# Patient Record
Sex: FEMALE | Race: WHITE | NOT HISPANIC OR LATINO | Employment: OTHER | URBAN - NONMETROPOLITAN AREA
[De-identification: names, ages, dates, MRNs, and addresses within clinical notes are randomized per-mention and may not be internally consistent; named-entity substitution may affect disease eponyms.]

---

## 2017-02-07 ENCOUNTER — LAB (OUTPATIENT)
Dept: ONCOLOGY | Facility: HOSPITAL | Age: 82
End: 2017-02-07

## 2017-02-07 ENCOUNTER — LAB (OUTPATIENT)
Dept: ONCOLOGY | Facility: CLINIC | Age: 82
End: 2017-02-07

## 2017-02-07 DIAGNOSIS — D64.9 NORMOCYTIC ANEMIA: Primary | ICD-10-CM

## 2017-02-07 DIAGNOSIS — D64.9 NORMOCYTIC ANEMIA: ICD-10-CM

## 2017-02-07 LAB
BASOPHILS # BLD AUTO: 0.02 10*3/MM3 (ref 0–0.3)
BASOPHILS NFR BLD AUTO: 0.4 % (ref 0–2)
CRP SERPL-MCNC: <0.5 MG/DL (ref 0–0.99)
DAT POLY-SP REAG RBC QL: NEGATIVE
DEPRECATED RDW RBC AUTO: 46.7 FL (ref 37–54)
EOSINOPHIL # BLD AUTO: 0.57 10*3/MM3 (ref 0–0.7)
EOSINOPHIL NFR BLD AUTO: 11.1 % (ref 0–7)
ERYTHROCYTE [DISTWIDTH] IN BLOOD BY AUTOMATED COUNT: 13.4 % (ref 11.5–14.5)
ERYTHROCYTE [SEDIMENTATION RATE] IN BLOOD: 37 MM/HR (ref 0–30)
FERRITIN SERPL-MCNC: 34 NG/ML (ref 10–290.3)
FOLATE SERPL-MCNC: >24 NG/ML (ref 5.4–20)
HCT VFR BLD AUTO: 29.6 % (ref 37–47)
HGB BLD-MCNC: 9.7 G/DL (ref 12–16)
IMM GRANULOCYTES # BLD: 0.01 10*3/MM3 (ref 0–0.03)
IMM GRANULOCYTES NFR BLD: 0.2 % (ref 0–0.5)
IRON 24H UR-MRATE: 60 MCG/DL (ref 49–151)
IRON SATN MFR SERPL: 17 % (ref 15–50)
LDH SERPL-CCNC: 192 U/L (ref 135–225)
LYMPHOCYTES # BLD AUTO: 1.75 10*3/MM3 (ref 1–3)
LYMPHOCYTES NFR BLD AUTO: 34.2 % (ref 16–46)
MCH RBC QN AUTO: 31.6 PG (ref 27–33)
MCHC RBC AUTO-ENTMCNC: 32.8 G/DL (ref 33–37)
MCV RBC AUTO: 96.4 FL (ref 80–94)
MONOCYTES # BLD AUTO: 0.59 10*3/MM3 (ref 0.1–0.9)
MONOCYTES NFR BLD AUTO: 11.5 % (ref 0–12)
NEUTROPHILS # BLD AUTO: 2.18 10*3/MM3 (ref 1.4–6.5)
NEUTROPHILS NFR BLD AUTO: 42.6 % (ref 40–75)
PLATELET # BLD AUTO: 246 10*3/MM3 (ref 130–400)
PMV BLD AUTO: 9.4 FL (ref 6–10)
RBC # BLD AUTO: 3.07 10*6/MM3 (ref 4.2–5.4)
RETICS #: 0.03 10*6/MM3 (ref 0.02–0.13)
RETICS/RBC NFR AUTO: 1.12 % (ref 0.5–2)
TIBC SERPL-MCNC: 360 MCG/DL (ref 241–421)
TSH SERPL DL<=0.05 MIU/L-ACNC: 1.04 MIU/ML (ref 0.55–4.78)
VIT B12 BLD-MCNC: 787 PG/ML (ref 211–911)
WBC NRBC COR # BLD: 5.12 10*3/MM3 (ref 4.5–12.5)

## 2017-02-07 PROCEDURE — 36415 COLL VENOUS BLD VENIPUNCTURE: CPT

## 2017-02-07 PROCEDURE — 86880 COOMBS TEST DIRECT: CPT

## 2017-02-07 PROCEDURE — 85060 BLOOD SMEAR INTERPRETATION: CPT | Performed by: INTERNAL MEDICINE

## 2017-02-08 LAB — HAPTOGLOB SERPL-MCNC: 196 MG/DL (ref 34–200)

## 2017-02-10 LAB
CYTOLOGIST CVX/VAG CYTO: NORMAL
PATH INTERP BLD-IMP: NORMAL

## 2017-03-28 ENCOUNTER — OFFICE VISIT (OUTPATIENT)
Dept: ONCOLOGY | Facility: CLINIC | Age: 82
End: 2017-03-28

## 2017-03-28 ENCOUNTER — LAB (OUTPATIENT)
Dept: ONCOLOGY | Facility: CLINIC | Age: 82
End: 2017-03-28

## 2017-03-28 VITALS
HEART RATE: 79 BPM | SYSTOLIC BLOOD PRESSURE: 120 MMHG | TEMPERATURE: 97.2 F | DIASTOLIC BLOOD PRESSURE: 55 MMHG | OXYGEN SATURATION: 96 % | RESPIRATION RATE: 18 BRPM | WEIGHT: 170.3 LBS

## 2017-03-28 DIAGNOSIS — D64.9 NORMOCYTIC ANEMIA: ICD-10-CM

## 2017-03-28 DIAGNOSIS — D64.9 NORMOCYTIC ANEMIA: Primary | ICD-10-CM

## 2017-03-28 LAB
BASOPHILS # BLD AUTO: 0.03 10*3/MM3 (ref 0–0.3)
BASOPHILS NFR BLD AUTO: 0.6 % (ref 0–2)
CHROMATIN AB SERPL-ACNC: 0 IU/ML (ref 0–14)
CRP SERPL-MCNC: <0.5 MG/DL (ref 0–0.99)
DEPRECATED RDW RBC AUTO: 47.5 FL (ref 37–54)
EOSINOPHIL # BLD AUTO: 0.48 10*3/MM3 (ref 0–0.7)
EOSINOPHIL NFR BLD AUTO: 9 % (ref 0–7)
ERYTHROCYTE [DISTWIDTH] IN BLOOD BY AUTOMATED COUNT: 13.7 % (ref 11.5–14.5)
ERYTHROCYTE [SEDIMENTATION RATE] IN BLOOD: 32 MM/HR (ref 0–30)
FERRITIN SERPL-MCNC: 42 NG/ML (ref 10–290.3)
HCT VFR BLD AUTO: 30.5 % (ref 37–47)
HGB BLD-MCNC: 9.8 G/DL (ref 12–16)
IMM GRANULOCYTES # BLD: 0 10*3/MM3 (ref 0–0.03)
IMM GRANULOCYTES NFR BLD: 0 % (ref 0–0.5)
IRON 24H UR-MRATE: 61 MCG/DL (ref 49–151)
IRON SATN MFR SERPL: 17 % (ref 15–50)
LYMPHOCYTES # BLD AUTO: 1.71 10*3/MM3 (ref 1–3)
LYMPHOCYTES NFR BLD AUTO: 32 % (ref 16–46)
MCH RBC QN AUTO: 30.6 PG (ref 27–33)
MCHC RBC AUTO-ENTMCNC: 32.1 G/DL (ref 33–37)
MCV RBC AUTO: 95.3 FL (ref 80–94)
MONOCYTES # BLD AUTO: 0.61 10*3/MM3 (ref 0.1–0.9)
MONOCYTES NFR BLD AUTO: 11.4 % (ref 0–12)
NEUTROPHILS # BLD AUTO: 2.51 10*3/MM3 (ref 1.4–6.5)
NEUTROPHILS NFR BLD AUTO: 47 % (ref 40–75)
PLATELET # BLD AUTO: 222 10*3/MM3 (ref 130–400)
PMV BLD AUTO: 9.3 FL (ref 6–10)
RBC # BLD AUTO: 3.2 10*6/MM3 (ref 4.2–5.4)
TIBC SERPL-MCNC: 363 MCG/DL (ref 241–421)
WBC NRBC COR # BLD: 5.34 10*3/MM3 (ref 4.5–12.5)

## 2017-03-28 PROCEDURE — 85025 COMPLETE CBC W/AUTO DIFF WBC: CPT | Performed by: INTERNAL MEDICINE

## 2017-03-28 PROCEDURE — 84165 PROTEIN E-PHORESIS SERUM: CPT | Performed by: INTERNAL MEDICINE

## 2017-03-28 PROCEDURE — 99215 OFFICE O/P EST HI 40 MIN: CPT | Performed by: INTERNAL MEDICINE

## 2017-03-28 PROCEDURE — 82525 ASSAY OF COPPER: CPT | Performed by: INTERNAL MEDICINE

## 2017-03-28 PROCEDURE — 83550 IRON BINDING TEST: CPT | Performed by: INTERNAL MEDICINE

## 2017-03-28 PROCEDURE — 84155 ASSAY OF PROTEIN SERUM: CPT | Performed by: INTERNAL MEDICINE

## 2017-03-28 PROCEDURE — 86431 RHEUMATOID FACTOR QUANT: CPT | Performed by: INTERNAL MEDICINE

## 2017-03-28 PROCEDURE — 85652 RBC SED RATE AUTOMATED: CPT | Performed by: INTERNAL MEDICINE

## 2017-03-28 PROCEDURE — 83883 ASSAY NEPHELOMETRY NOT SPEC: CPT | Performed by: INTERNAL MEDICINE

## 2017-03-28 PROCEDURE — 86038 ANTINUCLEAR ANTIBODIES: CPT | Performed by: INTERNAL MEDICINE

## 2017-03-28 PROCEDURE — 86334 IMMUNOFIX E-PHORESIS SERUM: CPT | Performed by: INTERNAL MEDICINE

## 2017-03-28 PROCEDURE — 86140 C-REACTIVE PROTEIN: CPT | Performed by: INTERNAL MEDICINE

## 2017-03-28 PROCEDURE — 83540 ASSAY OF IRON: CPT | Performed by: INTERNAL MEDICINE

## 2017-03-28 PROCEDURE — 36415 COLL VENOUS BLD VENIPUNCTURE: CPT | Performed by: INTERNAL MEDICINE

## 2017-03-28 PROCEDURE — 82728 ASSAY OF FERRITIN: CPT | Performed by: INTERNAL MEDICINE

## 2017-03-28 NOTE — PROGRESS NOTES
Kat Kenney  [id number]  1/3/1932  3/28/2017      Referring Provider:   Dr. Evelyn Douglas    Reason for Followup:   Normocytic Anemia    Chief Complaint:  Sinus drainage, allergies      History of Present Illness:  Kat Kenney is a very pleasant 85 y.o.  female who presents in follow up appointment for further management and evaluation of normocytic anemia.     She reports that she was found to have anemia while in the nursing home for rehabilitation after knee replacement. She is currently living at home and given her anemia that was seen she was then referred to our clinic for further evaluation. She reports that she most recently had a laboratory evaluation with her primary care physician two days ago and reports that she was still anemic. She was placed on oral iron however has been unable to take this due to intolerance and is now currently taking a multivitamin. She had previously followed with Dr. Morris for possible underlying malignancy but reports that workup was negative and she has not followed him in for two years. She did however followed with him in October and received 2 units of packed red blood cells. She did also have a bone marrow in 2006 which did reveal a mildly hypercellular marrow for her age with trilineage hematopoiesis.     Interim History:  She denies of any significant complaints today and reports some fatigue which she reports has improved since the start of an oral multivitmain containing iron. She continues to remain on plaquenil and methotrexate for her inflammatory arthritis.       The following portions of the patient's history were reviewed and updated as appropriate: allergies, current medications, past family history, past medical history, past social history, past surgical history and problem list.    Allergies   Allergen Reactions   • Benadryl [Diphenhydramine Hcl (Sleep)]    • Levaquin [Levofloxacin]    • Penicillins        Past Medical History:   Diagnosis  Date   • Anemia    • Asthma    • Disease of thyroid gland    • GERD (gastroesophageal reflux disease)    • Hypertension    Coronary artery disease s/p stent placement   Sick sinus syndrome  Hypothryoidism  Hyperlipidemia    Past Surgical History:   Procedure Laterality Date   • HYSTERECTOMY     • REPLACEMENT TOTAL KNEE     • SINUS SURGERY     Pacemaker    Social History     Social History   • Marital status:      Spouse name: N/A   • Number of children: N/A   • Years of education: N/A     Occupational History   • Not on file.     Social History Main Topics   • Smoking status: Never Smoker   • Smokeless tobacco: Never Used   • Alcohol use No   • Drug use: No   • Sexual activity: Not on file     Other Topics Concern   • Not on file     Social History Narrative   She lives by herself.    Family History   Problem Relation Age of Onset   • Breast cancer Sister    • Cancer Brother          Current Outpatient Prescriptions:   •  Albuterol Sulfate (PROAIR HFA IN), Inhale 2 puffs 2 (two) times a day., Disp: , Rfl:   •  aspirin 81 MG EC tablet, Take 81 mg by mouth daily., Disp: , Rfl:   •  atorvastatin (LIPITOR) 20 MG tablet, Take 20 mg by mouth daily., Disp: , Rfl:   •  calcium carbonate (OS-LETY) 600 MG tablet, Take 600 mg by mouth daily., Disp: , Rfl:   •  conjugated estrogens (PREMARIN) 0.625 MG/GM vaginal cream, Insert 0.5 g into the vagina daily., Disp: , Rfl:   •  dicyclomine (BENTYL) 10 MG capsule, Take 10 mg by mouth 4 (four) times a day before meals and nightly., Disp: , Rfl:   •  DULoxetine (CYMBALTA) 20 MG capsule, Take 20 mg by mouth daily., Disp: , Rfl:   •  fluticasone-salmeterol (ADVAIR DISKUS) 250-50 MCG/DOSE DISKUS, Inhale 1 puff 2 (two) times a day., Disp: , Rfl:   •  folic acid (FOLVITE) 1 MG tablet, Take 1 mg by mouth daily., Disp: , Rfl:   •  hydrochlorothiazide (MICROZIDE) 12.5 MG capsule, Take 12.5 mg by mouth daily., Disp: , Rfl:   •  hydroxychloroquine (PLAQUENIL) 200 MG tablet, Take 200 mg by  mouth daily., Disp: , Rfl:   •  levothyroxine (SYNTHROID, LEVOTHROID) 100 MCG tablet, Take 100 mcg by mouth daily., Disp: , Rfl:   •  losartan (COZAAR) 25 MG tablet, Take 25 mg by mouth daily., Disp: , Rfl:   •  methotrexate 2.5 MG tablet, Take 2.5 mg by mouth 1 (one) time per week., Disp: , Rfl:   •  metoprolol tartrate (LOPRESSOR) 50 MG tablet, Take 50 mg by mouth 2 (two) times a day., Disp: , Rfl:   •  O2 (OXYGEN), Inhale 2 L/min every night., Disp: , Rfl:   •  omeprazole (PriLOSEC) 40 MG capsule, Take 40 mg by mouth daily., Disp: , Rfl:   •  Ped Multivitamins-Fl-Iron (MULTIVITAMIN WITH FLUORIDE/IRON) 0.25-10 MG/ML solution solution, Take  by mouth Daily., Disp: , Rfl:   •  Probiotic Product (PROBIOTIC DAILY PO), Take 1 tablet by mouth daily., Disp: , Rfl:   •  sodium chloride (OCEAN) 0.65 % nasal spray, 1 spray into each nostril as needed for congestion., Disp: , Rfl:   •  traMADol (ULTRAM) 50 MG tablet, Take 50 mg by mouth 2 (two) times a day., Disp: , Rfl:   •  trimethoprim (TRIMPEX) 100 MG tablet, Take 100 mg by mouth 2 (two) times a day., Disp: , Rfl:         Review of Systems:  A comprehensive 12 point review of systems was obtained from patient and positive as per HPI otherwise negative. +fatigue improved, +weight gain, +sinus drainage, +constipation      Physical Exam:  Vital Signs: These were reviewed and listed as per patient’s electronic medical chart  Vitals:    03/28/17 1449   BP: 120/55   Pulse: 79   Resp: 18   Temp: 97.2 °F (36.2 °C)   SpO2: 96%     General: Awake, alert and oriented, in no distress, elderly  HEENT: Head is atraumatic, normocephalic, extraocular movements full, oropharynx clear, no scleral icterus, pink moist mucous membranes  Neck: supple, no jvd, lymphadenopathy or masses  Cardiovascular: regular rate and rhythm without murmurs, rubs or gallops  Pulmonary: non-labored, clear to auscultation bilaterally, no wheezing  Abdomen: soft, non-tender, non-distended, normal active bowel  sounds present, no organomegaly  Extremities: No clubbing, cyanosis or edema  Neurologic: Mental status as above, alert, awake and oriented, grossly non-focal exam  Skin: warm, dry, intact, chronic skin lesions  Lymph:  No cervical, supraclavicular, adenopathy  MSK: mobilizes with a cane, upper digit nodules and deviation        Labs / Studies:  LABS:  Results for CHAD WYNNE (MRN [id number]) as of 3/29/2017 08:06   Ref. Range 3/28/2017 15:45   Iron Latest Ref Range: 49 - 151 mcg/dL 61   Ferritin Latest Ref Range: 10.00 - 290.30 ng/mL 42.00   Iron Saturation Latest Ref Range: 15 - 50 % 17   TIBC Latest Ref Range: 241 - 421 mcg/dL 363   C-Reactive Protein Latest Ref Range: 0.00 - 0.99 mg/dL <0.50   WBC Latest Ref Range: 4.50 - 12.50 10*3/mm3 5.34   RBC Latest Ref Range: 4.20 - 5.40 10*6/mm3 3.20 (L)   Hemoglobin Latest Ref Range: 12.0 - 16.0 g/dL 9.8 (L)   Hematocrit Latest Ref Range: 37.0 - 47.0 % 30.5 (L)   RDW Latest Ref Range: 11.5 - 14.5 % 13.7   MCV Latest Ref Range: 80.0 - 94.0 fL 95.3 (H)   MCH Latest Ref Range: 27.0 - 33.0 pg 30.6   MCHC Latest Ref Range: 33.0 - 37.0 g/dL 32.1 (L)   MPV Latest Ref Range: 6.0 - 10.0 fL 9.3   Platelets Latest Ref Range: 130 - 400 10*3/mm3 222   RDW-SD Latest Ref Range: 37.0 - 54.0 fl 47.5   Neutrophil % Latest Ref Range: 40.0 - 75.0 % 47.0   Lymphocyte % Latest Ref Range: 16.0 - 46.0 % 32.0   Monocyte % Latest Ref Range: 0.0 - 12.0 % 11.4   Eosinophil % Latest Ref Range: 0.0 - 7.0 % 9.0 (H)   Basophil % Latest Ref Range: 0.0 - 2.0 % 0.6   Immature Grans % Latest Ref Range: 0.0 - 0.5 % 0.0   Neutrophils, Absolute Latest Ref Range: 1.40 - 6.50 10*3/mm3 2.51   Lymphocytes, Absolute Latest Ref Range: 1.00 - 3.00 10*3/mm3 1.71   Monocytes, Absolute Latest Ref Range: 0.10 - 0.90 10*3/mm3 0.61   Eosinophils, Absolute Latest Ref Range: 0.00 - 0.70 10*3/mm3 0.48   Basophils, Absolute Latest Ref Range: 0.00 - 0.30 10*3/mm3 0.03   Immature Grans, Absolute Latest Ref Range: 0.00 - 0.03  10*3/mm3 0.00   Sed Rate Latest Ref Range: 0 - 30 mm/hr 32 (H)   Rheumatoid Factor Quantitative Latest Ref Range: 0.0 - 14.0 IU/mL 0.0         PATHOLOGY:  09/02/16: Peripheral Smear: Normocytic anemia with mild anisopoikilocytosis. Normal white blood cell count and morphology, adequate platelets.  02/09/17: Peripheral Smear:          12/19/16: Flow cytometry:           Assessment/Plan :  Kat Kenney is a very pleasant 85 y.o.  female who presents in follow up appointment for further management and evaluation of normocytic anemia.      Normocytic Anemia  This is likely multifactorial in which iron deficiency versus anemia of chronic inflammation versus medications are contributing. A complete blood count continues to show ongoing anemia however this appears to be stable. She did receive 2 units of packed red blood cells at Dr. Márquez office in 10/2016. Her serum iron and iron saturation were low with a ferritin of 44 during that visit and she likely has some component of iron deficiency along with anemia of chronic inflammation. Today her iron panel continues to be within normal limits without supplementation (she has previously taken oral iron and was unable to tolerate the medication). Her B12 and folate were also normal. She had a negative GREY and did not show signs of hemolysis. ESR was elevated. Given thus far negative workup I did also send for an SPEP which showed no evidence of Mspike and there was no abnormal immunofixation. Her serum free light chains were both elevated with a normal ratio and thus is likely to underlying inflammation. It is possible that her inflammatory arthritis is causing inflammation which is suppressing her bone marrow and thus contributing to her anemia. I will also obtain an rheumatoid factor and SUDEEP, I also discussed the possibility of an underlying bone marrow disorder that could be contributing to her anemia, and I have recommended a bone marrow biopsy to further  evaluate. However, she does not want to pursue a bone marrow biopsy unless her anemia worsens. I did also obtain a flow cytometry which was significant for increased atypical NK cells and unsure of significance at present, this may be secondary to a reactive underlying process such as her inflammatory arthritis. I have also discussed with her the possibility of underlying bone marrow process that would require bone marrow biopsy to further evaluate. However at present she would like to continue to monitor. . She also was found to have increased eosinophils although eosinophils on differential have been normal with the exception of her previous lab draw and today's evaluation. Will repeat flow cytometry along with SPEP and serum free light chains. Will continue to reassess and monitor her blood counts and have her return in follow up appointment in 6 weeks for repeat laboratory evaluation.     I will have the patient return in follow up appointment in 3 months with labs in 6 weeks. She understands that should she have any questions or concerns prior to her appointment she should give us a call at any time and I would be happy to see her sooner. It was a pleasure to see this patient in clinic today, thank you for allowing me to participate in the care of this patient.    I spent 41 minutes in regards to this patient’s care today. More than 29 minutes of the time was spent in direct interaction with the patient for the above problems.        Rosmery Powell MD  03/28/17  3:04 PM

## 2017-03-29 LAB
ALBUMIN SERPL-MCNC: 3.3 G/DL (ref 2.9–4.4)
ALBUMIN/GLOB SERPL: 1.2 {RATIO} (ref 0.7–1.7)
ALPHA1 GLOB FLD ELPH-MCNC: 0.2 G/DL (ref 0–0.4)
ALPHA2 GLOB SERPL ELPH-MCNC: 0.7 G/DL (ref 0.4–1)
ANA SER QL: NEGATIVE
B-GLOBULIN SERPL ELPH-MCNC: 1 G/DL (ref 0.7–1.3)
GAMMA GLOB SERPL ELPH-MCNC: 1 G/DL (ref 0.4–1.8)
GLOBULIN SER CALC-MCNC: 2.9 G/DL (ref 2.2–3.9)
IGA SERPL-MCNC: 262 MG/DL (ref 64–422)
IGG SERPL-MCNC: 991 MG/DL (ref 700–1600)
IGM SERPL-MCNC: 33 MG/DL (ref 26–217)
INTERPRETATION SERPL IEP-IMP: NORMAL
KAPPA LC SERPL-MCNC: 34.48 MG/L (ref 3.3–19.4)
KAPPA LC/LAMBDA SER: 1.07 {RATIO} (ref 0.26–1.65)
LAMBDA LC FREE SERPL-MCNC: 32.34 MG/L (ref 5.71–26.3)
Lab: NORMAL
M-SPIKE: NORMAL G/DL
PROT SERPL-MCNC: 6.2 G/DL (ref 6–8.5)

## 2017-03-30 LAB — COPPER SERPL-MCNC: 127 UG/DL (ref 72–166)

## 2017-04-03 LAB — LEUK/LYMPH PHENO: NORMAL

## 2017-05-09 ENCOUNTER — LAB (OUTPATIENT)
Dept: ONCOLOGY | Facility: CLINIC | Age: 82
End: 2017-05-09

## 2017-05-09 VITALS
OXYGEN SATURATION: 97 % | DIASTOLIC BLOOD PRESSURE: 69 MMHG | SYSTOLIC BLOOD PRESSURE: 140 MMHG | RESPIRATION RATE: 18 BRPM | TEMPERATURE: 98.2 F | HEART RATE: 74 BPM

## 2017-05-09 DIAGNOSIS — D64.9 NORMOCYTIC ANEMIA: ICD-10-CM

## 2017-05-09 LAB
BASOPHILS # BLD AUTO: 0.03 10*3/MM3 (ref 0–0.3)
BASOPHILS NFR BLD AUTO: 0.5 % (ref 0–2)
DEPRECATED RDW RBC AUTO: 47.3 FL (ref 37–54)
EOSINOPHIL # BLD AUTO: 0.52 10*3/MM3 (ref 0–0.7)
EOSINOPHIL NFR BLD AUTO: 8 % (ref 0–7)
ERYTHROCYTE [DISTWIDTH] IN BLOOD BY AUTOMATED COUNT: 13.8 % (ref 11.5–14.5)
HCT VFR BLD AUTO: 30 % (ref 37–47)
HGB BLD-MCNC: 9.9 G/DL (ref 12–16)
IMM GRANULOCYTES # BLD: 0.01 10*3/MM3 (ref 0–0.03)
IMM GRANULOCYTES NFR BLD: 0.2 % (ref 0–0.5)
LYMPHOCYTES # BLD AUTO: 2.04 10*3/MM3 (ref 1–3)
LYMPHOCYTES NFR BLD AUTO: 31.4 % (ref 16–46)
MCH RBC QN AUTO: 31.4 PG (ref 27–33)
MCHC RBC AUTO-ENTMCNC: 33 G/DL (ref 33–37)
MCV RBC AUTO: 95.2 FL (ref 80–94)
MONOCYTES # BLD AUTO: 0.84 10*3/MM3 (ref 0.1–0.9)
MONOCYTES NFR BLD AUTO: 12.9 % (ref 0–12)
NEUTROPHILS # BLD AUTO: 3.05 10*3/MM3 (ref 1.4–6.5)
NEUTROPHILS NFR BLD AUTO: 47 % (ref 40–75)
PLATELET # BLD AUTO: 258 10*3/MM3 (ref 130–400)
PMV BLD AUTO: 9 FL (ref 6–10)
RBC # BLD AUTO: 3.15 10*6/MM3 (ref 4.2–5.4)
WBC NRBC COR # BLD: 6.49 10*3/MM3 (ref 4.5–12.5)

## 2017-05-09 PROCEDURE — 85025 COMPLETE CBC W/AUTO DIFF WBC: CPT | Performed by: INTERNAL MEDICINE

## 2017-05-09 PROCEDURE — 36415 COLL VENOUS BLD VENIPUNCTURE: CPT | Performed by: INTERNAL MEDICINE

## 2017-07-11 ENCOUNTER — OFFICE VISIT (OUTPATIENT)
Dept: ONCOLOGY | Facility: CLINIC | Age: 82
End: 2017-07-11

## 2017-07-11 VITALS
HEART RATE: 78 BPM | DIASTOLIC BLOOD PRESSURE: 61 MMHG | RESPIRATION RATE: 18 BRPM | TEMPERATURE: 98 F | WEIGHT: 171.9 LBS | OXYGEN SATURATION: 95 % | SYSTOLIC BLOOD PRESSURE: 131 MMHG

## 2017-07-11 DIAGNOSIS — R89.8 EOSINOPHIL COUNT RAISED: ICD-10-CM

## 2017-07-11 DIAGNOSIS — D64.9 NORMOCYTIC ANEMIA: Primary | ICD-10-CM

## 2017-07-11 LAB
ALBUMIN SERPL-MCNC: 4 G/DL (ref 3.4–4.8)
ALBUMIN/GLOB SERPL: 1.3 G/DL (ref 1.5–2.5)
ALP SERPL-CCNC: 69 U/L (ref 35–104)
ALT SERPL W P-5'-P-CCNC: 15 U/L (ref 10–36)
ANION GAP SERPL CALCULATED.3IONS-SCNC: 5.1 MMOL/L (ref 3.6–11.2)
AST SERPL-CCNC: 24 U/L (ref 10–30)
BASOPHILS # BLD AUTO: 0.02 10*3/MM3 (ref 0–0.3)
BASOPHILS NFR BLD AUTO: 0.3 % (ref 0–2)
BILIRUB SERPL-MCNC: 0.4 MG/DL (ref 0.2–1.8)
BUN BLD-MCNC: 19 MG/DL (ref 7–21)
BUN/CREAT SERPL: 22.1 (ref 7–25)
CALCIUM SPEC-SCNC: 8.7 MG/DL (ref 7.7–10)
CHLORIDE SERPL-SCNC: 100 MMOL/L (ref 99–112)
CO2 SERPL-SCNC: 27.9 MMOL/L (ref 24.3–31.9)
CREAT BLD-MCNC: 0.86 MG/DL (ref 0.43–1.29)
DEPRECATED RDW RBC AUTO: 44.7 FL (ref 37–54)
EOSINOPHIL # BLD AUTO: 0.62 10*3/MM3 (ref 0–0.7)
EOSINOPHIL NFR BLD AUTO: 10.1 % (ref 0–7)
ERYTHROCYTE [DISTWIDTH] IN BLOOD BY AUTOMATED COUNT: 13.1 % (ref 11.5–14.5)
FERRITIN SERPL-MCNC: 53 NG/ML (ref 10–290.3)
FOLATE SERPL-MCNC: >24 NG/ML (ref 5.4–20)
GFR SERPL CREATININE-BSD FRML MDRD: 63 ML/MIN/1.73
GLOBULIN UR ELPH-MCNC: 3 GM/DL
GLUCOSE BLD-MCNC: 102 MG/DL (ref 70–110)
HCT VFR BLD AUTO: 29.6 % (ref 37–47)
HGB BLD-MCNC: 9.7 G/DL (ref 12–16)
IMM GRANULOCYTES # BLD: 0.01 10*3/MM3 (ref 0–0.03)
IMM GRANULOCYTES NFR BLD: 0.2 % (ref 0–0.5)
IRON 24H UR-MRATE: 105 MCG/DL (ref 49–151)
IRON SATN MFR SERPL: 33 % (ref 15–50)
LYMPHOCYTES # BLD AUTO: 2.11 10*3/MM3 (ref 1–3)
LYMPHOCYTES NFR BLD AUTO: 34.4 % (ref 16–46)
MCH RBC QN AUTO: 30.6 PG (ref 27–33)
MCHC RBC AUTO-ENTMCNC: 32.8 G/DL (ref 33–37)
MCV RBC AUTO: 93.4 FL (ref 80–94)
MONOCYTES # BLD AUTO: 0.76 10*3/MM3 (ref 0.1–0.9)
MONOCYTES NFR BLD AUTO: 12.4 % (ref 0–12)
NEUTROPHILS # BLD AUTO: 2.62 10*3/MM3 (ref 1.4–6.5)
NEUTROPHILS NFR BLD AUTO: 42.6 % (ref 40–75)
OSMOLALITY SERPL CALC.SUM OF ELEC: 268.8 MOSM/KG (ref 273–305)
PLATELET # BLD AUTO: 262 10*3/MM3 (ref 130–400)
PMV BLD AUTO: 8.9 FL (ref 6–10)
POTASSIUM BLD-SCNC: 4.6 MMOL/L (ref 3.5–5.3)
PROT SERPL-MCNC: 7 G/DL (ref 6–8)
RBC # BLD AUTO: 3.17 10*6/MM3 (ref 4.2–5.4)
SODIUM BLD-SCNC: 133 MMOL/L (ref 135–153)
TIBC SERPL-MCNC: 319 MCG/DL (ref 241–421)
VIT B12 BLD-MCNC: 807 PG/ML (ref 211–911)
WBC NRBC COR # BLD: 6.14 10*3/MM3 (ref 4.5–12.5)

## 2017-07-11 PROCEDURE — 82728 ASSAY OF FERRITIN: CPT | Performed by: INTERNAL MEDICINE

## 2017-07-11 PROCEDURE — 83550 IRON BINDING TEST: CPT | Performed by: INTERNAL MEDICINE

## 2017-07-11 PROCEDURE — 82746 ASSAY OF FOLIC ACID SERUM: CPT | Performed by: INTERNAL MEDICINE

## 2017-07-11 PROCEDURE — 83540 ASSAY OF IRON: CPT | Performed by: INTERNAL MEDICINE

## 2017-07-11 PROCEDURE — 85025 COMPLETE CBC W/AUTO DIFF WBC: CPT | Performed by: INTERNAL MEDICINE

## 2017-07-11 PROCEDURE — 80053 COMPREHEN METABOLIC PANEL: CPT | Performed by: INTERNAL MEDICINE

## 2017-07-11 PROCEDURE — 99214 OFFICE O/P EST MOD 30 MIN: CPT | Performed by: INTERNAL MEDICINE

## 2017-07-11 PROCEDURE — 82607 VITAMIN B-12: CPT | Performed by: INTERNAL MEDICINE

## 2017-07-11 NOTE — PROGRESS NOTES
Kat Kenney  [id number]  1/3/1932  7/11/2017      Referring Provider:   Dr. Evelyn Douglas    Reason for Followup:   Normocytic Anemia    Chief Complaint:  Allergies      History of Present Illness:  Kat Kenney is a very pleasant 85 y.o.  female who presents in follow up appointment for further management and evaluation of normocytic anemia.     She reports that she was found to have anemia while in the nursing home for rehabilitation after knee replacement. She is currently living at home and given her anemia that was seen she was then referred to our clinic for further evaluation. She reports that she most recently had a laboratory evaluation with her primary care physician two days ago and reports that she was still anemic. She was placed on oral iron however has been unable to take this due to intolerance and is now currently taking a multivitamin. She had previously followed with Dr. Morris for possible underlying malignancy but reports that workup was negative and she has not followed him in for two years. She did however followed with him in October and received 2 units of packed red blood cells. She did also have a bone marrow in 2006 which did reveal a mildly hypercellular marrow for her age with trilineage hematopoiesis.     Interim History:  She denies of any significant changes since her last visit with me. She does note ongoing fatigue however this has overall improved. She continues to remain on plaquenil and methotrexate for her inflammatory arthritis. She does note that she does have severe allergies in which she has to follow with an allergist for intermittent injections.      The following portions of the patient's history were reviewed and updated as appropriate: allergies, current medications, past family history, past medical history, past social history, past surgical history and problem list.    Allergies   Allergen Reactions   • Benadryl [Diphenhydramine Hcl (Sleep)]    •  Levaquin [Levofloxacin]    • Penicillins        Past Medical History:   Diagnosis Date   • Anemia    • Asthma    • Disease of thyroid gland    • GERD (gastroesophageal reflux disease)    • Hypertension    Coronary artery disease s/p stent placement   Sick sinus syndrome  Hypothryoidism  Hyperlipidemia    Past Surgical History:   Procedure Laterality Date   • HYSTERECTOMY     • REPLACEMENT TOTAL KNEE     • SINUS SURGERY     Pacemaker    Social History     Social History   • Marital status:      Spouse name: N/A   • Number of children: N/A   • Years of education: N/A     Occupational History   • Not on file.     Social History Main Topics   • Smoking status: Never Smoker   • Smokeless tobacco: Never Used   • Alcohol use No   • Drug use: No   • Sexual activity: Not on file     Other Topics Concern   • Not on file     Social History Narrative   She lives by herself.    Family History   Problem Relation Age of Onset   • Breast cancer Sister    • Cancer Brother          Current Outpatient Prescriptions:   •  Albuterol Sulfate (PROAIR HFA IN), Inhale 2 puffs 2 (two) times a day., Disp: , Rfl:   •  aspirin 81 MG EC tablet, Take 81 mg by mouth daily., Disp: , Rfl:   •  atorvastatin (LIPITOR) 20 MG tablet, Take 20 mg by mouth daily., Disp: , Rfl:   •  calcium carbonate (OS-LETY) 600 MG tablet, Take 600 mg by mouth daily., Disp: , Rfl:   •  conjugated estrogens (PREMARIN) 0.625 MG/GM vaginal cream, Insert 0.5 g into the vagina daily., Disp: , Rfl:   •  dicyclomine (BENTYL) 10 MG capsule, Take 10 mg by mouth 4 (four) times a day before meals and nightly., Disp: , Rfl:   •  DULoxetine (CYMBALTA) 20 MG capsule, Take 20 mg by mouth daily., Disp: , Rfl:   •  fluticasone-salmeterol (ADVAIR DISKUS) 250-50 MCG/DOSE DISKUS, Inhale 1 puff 2 (two) times a day., Disp: , Rfl:   •  folic acid (FOLVITE) 1 MG tablet, Take 1 mg by mouth daily., Disp: , Rfl:   •  hydrochlorothiazide (MICROZIDE) 12.5 MG capsule, Take 12.5 mg by mouth  daily., Disp: , Rfl:   •  hydroxychloroquine (PLAQUENIL) 200 MG tablet, Take 200 mg by mouth daily., Disp: , Rfl:   •  levothyroxine (SYNTHROID, LEVOTHROID) 100 MCG tablet, Take 100 mcg by mouth daily., Disp: , Rfl:   •  losartan (COZAAR) 25 MG tablet, Take 25 mg by mouth daily., Disp: , Rfl:   •  methotrexate 2.5 MG tablet, Take 2.5 mg by mouth 1 (one) time per week., Disp: , Rfl:   •  metoprolol tartrate (LOPRESSOR) 50 MG tablet, Take 50 mg by mouth 2 (two) times a day., Disp: , Rfl:   •  O2 (OXYGEN), Inhale 2 L/min every night., Disp: , Rfl:   •  omeprazole (PriLOSEC) 40 MG capsule, Take 40 mg by mouth daily., Disp: , Rfl:   •  Ped Multivitamins-Fl-Iron (MULTIVITAMIN WITH FLUORIDE/IRON) 0.25-10 MG/ML solution solution, Take  by mouth Daily., Disp: , Rfl:   •  Probiotic Product (PROBIOTIC DAILY PO), Take 1 tablet by mouth daily., Disp: , Rfl:   •  sodium chloride (OCEAN) 0.65 % nasal spray, 1 spray into each nostril as needed for congestion., Disp: , Rfl:   •  traMADol (ULTRAM) 50 MG tablet, Take 50 mg by mouth 2 (two) times a day., Disp: , Rfl:   •  trimethoprim (TRIMPEX) 100 MG tablet, Take 100 mg by mouth 2 (two) times a day., Disp: , Rfl:         Review of Systems:  A comprehensive 12 point review of systems was obtained from patient and positive as per HPI otherwise negative. +fatigue improved, +joint pain      Physical Exam:  Vital Signs: These were reviewed and listed as per patient’s electronic medical chart  Vitals:    07/11/17 1405   BP: 131/61   Pulse: 78   Resp: 18   Temp: 98 °F (36.7 °C)   SpO2: 95%     General: Awake, alert and oriented, in no distress, elderly  HEENT: Head is atraumatic, normocephalic, extraocular movements full, oropharynx clear, no scleral icterus, pink moist mucous membranes  Neck: supple, no jvd, lymphadenopathy or masses  Cardiovascular: regular rate and rhythm without murmurs, rubs or gallops  Pulmonary: non-labored, clear to auscultation bilaterally, no wheezing  Abdomen:  soft, non-tender, non-distended, normal active bowel sounds present, no organomegaly  Extremities: No clubbing, cyanosis or edema  Neurologic: Mental status as above, alert, awake and oriented, grossly non-focal exam  Skin: warm, dry, intact, chronic skin lesions  Lymph:  No cervical, supraclavicular, adenopathy  MSK: mobilizes with a cane, upper digit nodules and deviation        Labs / Studies:  LABS:  Results for CHAD WYNNE (MRN [id number]) as of 7/13/2017 06:43   Ref. Range 7/11/2017 14:57   Glucose Latest Ref Range: 70 - 110 mg/dL 102   Sodium Latest Ref Range: 135 - 153 mmol/L 133 (L)   Potassium Latest Ref Range: 3.5 - 5.3 mmol/L 4.6   CO2 Latest Ref Range: 24.3 - 31.9 mmol/L 27.9   Chloride Latest Ref Range: 99 - 112 mmol/L 100   Anion Gap Latest Ref Range: 3.6 - 11.2 mmol/L 5.1   Creatinine Latest Ref Range: 0.43 - 1.29 mg/dL 0.86   BUN Latest Ref Range: 7 - 21 mg/dL 19   BUN/Creatinine Ratio Latest Ref Range: 7.0 - 25.0  22.1   Calcium Latest Ref Range: 7.7 - 10.0 mg/dL 8.7   eGFR Non African Amer Latest Ref Range: >60 mL/min/1.73 63   Alkaline Phosphatase Latest Ref Range: 35 - 104 U/L 69   Total Protein Latest Ref Range: 6.0 - 8.0 g/dL 7.0   ALT (SGPT) Latest Ref Range: 10 - 36 U/L 15   AST (SGOT) Latest Ref Range: 10 - 30 U/L 24   Total Bilirubin Latest Ref Range: 0.2 - 1.8 mg/dL 0.4   Albumin Latest Ref Range: 3.40 - 4.80 g/dL 4.00   Globulin Latest Units: gm/dL 3.0   A/G Ratio Latest Ref Range: 1.5 - 2.5 g/dL 1.3 (L)   Iron Latest Ref Range: 49 - 151 mcg/dL 105   Ferritin Latest Ref Range: 10.00 - 290.30 ng/mL 53.00   Iron Saturation Latest Ref Range: 15 - 50 % 33   TIBC Latest Ref Range: 241 - 421 mcg/dL 319   Folate Latest Ref Range: 5.40 - 20.00 ng/mL >24.00 (H)   Vitamin B-12 Latest Ref Range: 211 - 911 pg/mL 807   WBC Latest Ref Range: 4.50 - 12.50 10*3/mm3 6.14   RBC Latest Ref Range: 4.20 - 5.40 10*6/mm3 3.17 (L)   Hemoglobin Latest Ref Range: 12.0 - 16.0 g/dL 9.7 (L)   Hematocrit Latest Ref  Range: 37.0 - 47.0 % 29.6 (L)   RDW Latest Ref Range: 11.5 - 14.5 % 13.1   MCV Latest Ref Range: 80.0 - 94.0 fL 93.4   MCH Latest Ref Range: 27.0 - 33.0 pg 30.6   MCHC Latest Ref Range: 33.0 - 37.0 g/dL 32.8 (L)   MPV Latest Ref Range: 6.0 - 10.0 fL 8.9   Platelets Latest Ref Range: 130 - 400 10*3/mm3 262   RDW-SD Latest Ref Range: 37.0 - 54.0 fl 44.7   Neutrophil % Latest Ref Range: 40.0 - 75.0 % 42.6   Lymphocyte % Latest Ref Range: 16.0 - 46.0 % 34.4   Monocyte % Latest Ref Range: 0.0 - 12.0 % 12.4 (H)   Eosinophil % Latest Ref Range: 0.0 - 7.0 % 10.1 (H)   Basophil % Latest Ref Range: 0.0 - 2.0 % 0.3   Immature Grans % Latest Ref Range: 0.0 - 0.5 % 0.2   Neutrophils, Absolute Latest Ref Range: 1.40 - 6.50 10*3/mm3 2.62   Lymphocytes, Absolute Latest Ref Range: 1.00 - 3.00 10*3/mm3 2.11   Monocytes, Absolute Latest Ref Range: 0.10 - 0.90 10*3/mm3 0.76   Eosinophils, Absolute Latest Ref Range: 0.00 - 0.70 10*3/mm3 0.62   Basophils, Absolute Latest Ref Range: 0.00 - 0.30 10*3/mm3 0.02   Immature Grans, Absolute Latest Ref Range: 0.00 - 0.03 10*3/mm3 0.01         PATHOLOGY:  09/02/16: Peripheral Smear: Normocytic anemia with mild anisopoikilocytosis. Normal white blood cell count and morphology, adequate platelets.  02/09/17: Peripheral Smear:          12/19/16: Flow cytometry:       03/29/17: Flow Cytometry:          Assessment/Plan :  Kat Kenney is a very pleasant 85 y.o.  female who presents in follow up appointment for further management and evaluation of normocytic anemia.      Normocytic Anemia  This is likely multifactorial in which iron deficiency versus anemia of chronic inflammation versus medications are contributing. A complete blood count continues to show ongoing anemia however this appears to be stable. She did receive 2 units of packed red blood cells at Dr. Márquez office in 10/2016. Her serum iron and iron saturation were low with a ferritin of 44 during that visit and she likely has some  component of iron deficiency along with anemia of chronic inflammation. Today her iron panel continues to be within normal limits without supplementation (she has previously taken oral iron and was unable to tolerate the medication). Her B12 and folate were also normal. She had a negative GREY and did not show signs of hemolysis. ESR was elevated. Given thus far negative workup I did also send for an SPEP which showed no evidence of Mspike and there was no abnormal immunofixation. Her serum free light chains were both elevated with a normal ratio and thus is likely to underlying inflammation. It is possible that her inflammatory arthritis is causing inflammation which is suppressing her bone marrow and thus contributing to her anemia. I will also obtain an rheumatoid factor and SUDEEP, I also discussed the possibility of an underlying bone marrow disorder that could be contributing to her anemia, and I have recommended a bone marrow biopsy to further evaluate. However, she does not want to pursue a bone marrow biopsy unless her anemia worsens. I did also obtain a flow cytometry which was significant for increased atypical NK cells and unsure of significance at present, this may be secondary to a reactive underlying process such as her inflammatory arthritis. I have also discussed with her the possibility of underlying bone marrow process that would require bone marrow biopsy to further evaluate. However at present she would like to continue to monitor. She also was found to have increased eosinophils although eosinophils on differential have been normal with the exception of her previous lab draw and today's evaluation. On repeat flow cytometry NK cells were no longer noted however she continues to have an elevated eosinophil level. Therefore will prescribe Ivermectin in the event that an underlying parasite infection is contributing however this may be related to her underlying allergies which she describes as being  severe.     Elevated Eosinophils  I suspect this is likely secondary to her severe underlying allergies that she describes. Will also however treat for possible underlying parasitic infection with Ivermectin and will re-evaluate and next visit.      I will have the patient return in follow up appointment in 3 months. She understands that should she have any questions or concerns prior to her appointment she should give us a call at any time and I would be happy to see her sooner. It was a pleasure to see this patient in clinic today, thank you for allowing me to participate in the care of this patient.    I spent 31 minutes in regards to this patient’s care today. More than 25 minutes of the time was spent in direct interaction with the patient for the above problems.        Rosmery Powell MD  07/11/17  2:07 PM

## 2017-07-13 RX ORDER — IVERMECTIN 3 MG/1
200 TABLET ORAL ONCE
Status: CANCELLED | OUTPATIENT
Start: 2017-07-13 | End: 2017-07-13

## 2017-07-17 RX ORDER — IVERMECTIN 3 MG/1
15 TABLET ORAL ONCE
Qty: 10 TABLET | Refills: 0 | Status: SHIPPED | OUTPATIENT
Start: 2017-07-17 | End: 2017-07-17

## 2017-10-04 ENCOUNTER — TRANSCRIBE ORDERS (OUTPATIENT)
Dept: ADMINISTRATIVE | Facility: HOSPITAL | Age: 82
End: 2017-10-04

## 2017-10-04 DIAGNOSIS — Z12.39 BREAST SCREENING: Primary | ICD-10-CM

## 2017-10-10 ENCOUNTER — OFFICE VISIT (OUTPATIENT)
Dept: ONCOLOGY | Facility: CLINIC | Age: 82
End: 2017-10-10

## 2017-10-10 VITALS
TEMPERATURE: 98.8 F | SYSTOLIC BLOOD PRESSURE: 126 MMHG | HEART RATE: 73 BPM | OXYGEN SATURATION: 96 % | RESPIRATION RATE: 18 BRPM | DIASTOLIC BLOOD PRESSURE: 65 MMHG | WEIGHT: 174.6 LBS

## 2017-10-10 DIAGNOSIS — R89.8 EOSINOPHIL COUNT RAISED: ICD-10-CM

## 2017-10-10 DIAGNOSIS — D50.9 IRON DEFICIENCY ANEMIA, UNSPECIFIED IRON DEFICIENCY ANEMIA TYPE: ICD-10-CM

## 2017-10-10 DIAGNOSIS — D64.9 NORMOCYTIC ANEMIA: Primary | ICD-10-CM

## 2017-10-10 LAB
BASOPHILS # BLD AUTO: 0.02 10*3/MM3 (ref 0–0.3)
BASOPHILS NFR BLD AUTO: 0.3 % (ref 0–2)
DEPRECATED RDW RBC AUTO: 43.6 FL (ref 37–54)
EOSINOPHIL # BLD AUTO: 0.6 10*3/MM3 (ref 0–0.7)
EOSINOPHIL NFR BLD AUTO: 10 % (ref 0–7)
ERYTHROCYTE [DISTWIDTH] IN BLOOD BY AUTOMATED COUNT: 13.3 % (ref 11.5–14.5)
FERRITIN SERPL-MCNC: 48 NG/ML (ref 10–290.3)
HCT VFR BLD AUTO: 29.6 % (ref 37–47)
HGB BLD-MCNC: 9.6 G/DL (ref 12–16)
IMM GRANULOCYTES # BLD: 0.01 10*3/MM3 (ref 0–0.03)
IMM GRANULOCYTES NFR BLD: 0.2 % (ref 0–0.5)
IRON 24H UR-MRATE: 125 MCG/DL (ref 49–151)
IRON SATN MFR SERPL: 40 % (ref 15–50)
LYMPHOCYTES # BLD AUTO: 2.17 10*3/MM3 (ref 1–3)
LYMPHOCYTES NFR BLD AUTO: 36.3 % (ref 16–46)
MCH RBC QN AUTO: 31.6 PG (ref 27–33)
MCHC RBC AUTO-ENTMCNC: 32.4 G/DL (ref 33–37)
MCV RBC AUTO: 97.4 FL (ref 80–94)
MONOCYTES # BLD AUTO: 0.73 10*3/MM3 (ref 0.1–0.9)
MONOCYTES NFR BLD AUTO: 12.2 % (ref 0–12)
NEUTROPHILS # BLD AUTO: 2.45 10*3/MM3 (ref 1.4–6.5)
NEUTROPHILS NFR BLD AUTO: 41 % (ref 40–75)
PLATELET # BLD AUTO: 229 10*3/MM3 (ref 130–400)
PMV BLD AUTO: 8.7 FL (ref 6–10)
RBC # BLD AUTO: 3.04 10*6/MM3 (ref 4.2–5.4)
TIBC SERPL-MCNC: 312 MCG/DL (ref 241–421)
WBC NRBC COR # BLD: 5.98 10*3/MM3 (ref 4.5–12.5)

## 2017-10-10 PROCEDURE — 99214 OFFICE O/P EST MOD 30 MIN: CPT | Performed by: INTERNAL MEDICINE

## 2017-10-10 PROCEDURE — 83550 IRON BINDING TEST: CPT | Performed by: INTERNAL MEDICINE

## 2017-10-10 PROCEDURE — 83540 ASSAY OF IRON: CPT | Performed by: INTERNAL MEDICINE

## 2017-10-10 PROCEDURE — 82728 ASSAY OF FERRITIN: CPT | Performed by: INTERNAL MEDICINE

## 2017-10-10 PROCEDURE — 85025 COMPLETE CBC W/AUTO DIFF WBC: CPT | Performed by: INTERNAL MEDICINE

## 2017-10-10 NOTE — PROGRESS NOTES
Kat Kenney  [id number]  1/3/1932  10/10/2017      Referring Provider:   Dr. Evelyn Douglas    Reason for Followup:   Normocytic Anemia    Chief Complaint:  Fatigue improved      History of Present Illness:  Kat Kenney is a very pleasant 85 y.o.  female who presents in follow up appointment for further management and evaluation of normocytic anemia.     She was found to have anemia while residing in the nursing home for rehabilitation after knee replacement at the end of 2016. At the time of her initial consultation she had left [facility] and was then living at home and given her persistent anemia she was referred to our clinic for further evaluation. She was previously placed on oral iron for a history of iron deficiency however was unable to tolerate the medication due to intolerance and is now currently taking a multivitamin. She had previously followed with Dr. Morris for possible underlying malignancy but reports that workup was negative (including a bone marrow biopsy) and she had not followed with him in for two years prior to her initial consultation with me with the exception in that she followed with him October 2016 where she received 2 units of packed red blood cells for her anemia. She has not required further blood transfusions. She did also have a bone marrow in 2006 which did reveal a mildly hypercellular marrow for her age with trilineage hematopoiesis.     Interim History:  She denies of any significant changes since her last visit with me. She reports that overall she is feeling rather well. She continues to remain on plaquenil and methotrexate for her inflammatory arthritis. She does have a history of severe allergies in which she has to follow with an allergist for intermittent injections however denies of any allergy related symptoms during her visit today.      The following portions of the patient's history were reviewed and updated as appropriate: allergies, current  medications, past family history, past medical history, past social history, past surgical history and problem list.    Allergies   Allergen Reactions   • Benadryl [Diphenhydramine Hcl (Sleep)]    • Levaquin [Levofloxacin]    • Penicillins        Past Medical History:   Diagnosis Date   • Anemia    • Asthma    • Disease of thyroid gland    • GERD (gastroesophageal reflux disease)    • Hypertension    Coronary artery disease s/p stent placement   Sick sinus syndrome  Hypothryoidism  Hyperlipidemia    Past Surgical History:   Procedure Laterality Date   • HYSTERECTOMY     • REPLACEMENT TOTAL KNEE     • SINUS SURGERY     Pacemaker    Social History     Social History   • Marital status:      Spouse name: N/A   • Number of children: N/A   • Years of education: N/A     Occupational History   • Not on file.     Social History Main Topics   • Smoking status: Never Smoker   • Smokeless tobacco: Never Used   • Alcohol use No   • Drug use: No   • Sexual activity: Not on file     Other Topics Concern   • Not on file     Social History Narrative   She lives by herself.    Family History   Problem Relation Age of Onset   • Breast cancer Sister    • Cancer Brother          Current Outpatient Prescriptions:   •  Albuterol Sulfate (PROAIR HFA IN), Inhale 2 puffs 2 (two) times a day., Disp: , Rfl:   •  aspirin 81 MG EC tablet, Take 81 mg by mouth daily., Disp: , Rfl:   •  atorvastatin (LIPITOR) 20 MG tablet, Take 20 mg by mouth daily., Disp: , Rfl:   •  calcium carbonate (OS-LETY) 600 MG tablet, Take 600 mg by mouth daily., Disp: , Rfl:   •  conjugated estrogens (PREMARIN) 0.625 MG/GM vaginal cream, Insert 0.5 g into the vagina daily., Disp: , Rfl:   •  dicyclomine (BENTYL) 10 MG capsule, Take 10 mg by mouth 4 (four) times a day before meals and nightly., Disp: , Rfl:   •  DULoxetine (CYMBALTA) 20 MG capsule, Take 20 mg by mouth daily., Disp: , Rfl:   •  fluticasone-salmeterol (ADVAIR DISKUS) 250-50 MCG/DOSE DISKUS, Inhale 1  puff 2 (two) times a day., Disp: , Rfl:   •  folic acid (FOLVITE) 1 MG tablet, Take 1 mg by mouth daily., Disp: , Rfl:   •  hydrochlorothiazide (MICROZIDE) 12.5 MG capsule, Take 12.5 mg by mouth daily., Disp: , Rfl:   •  hydroxychloroquine (PLAQUENIL) 200 MG tablet, Take 200 mg by mouth daily., Disp: , Rfl:   •  levothyroxine (SYNTHROID, LEVOTHROID) 100 MCG tablet, Take 100 mcg by mouth daily., Disp: , Rfl:   •  losartan (COZAAR) 25 MG tablet, Take 25 mg by mouth daily., Disp: , Rfl:   •  methotrexate 2.5 MG tablet, Take 2.5 mg by mouth 1 (one) time per week., Disp: , Rfl:   •  metoprolol tartrate (LOPRESSOR) 50 MG tablet, Take 50 mg by mouth 2 (two) times a day., Disp: , Rfl:   •  O2 (OXYGEN), Inhale 2 L/min every night., Disp: , Rfl:   •  omeprazole (PriLOSEC) 40 MG capsule, Take 40 mg by mouth daily., Disp: , Rfl:   •  Ped Multivitamins-Fl-Iron (MULTIVITAMIN WITH FLUORIDE/IRON) 0.25-10 MG/ML solution solution, Take  by mouth Daily., Disp: , Rfl:   •  Probiotic Product (PROBIOTIC DAILY PO), Take 1 tablet by mouth daily., Disp: , Rfl:   •  sodium chloride (OCEAN) 0.65 % nasal spray, 1 spray into each nostril as needed for congestion., Disp: , Rfl:   •  traMADol (ULTRAM) 50 MG tablet, Take 50 mg by mouth 2 (two) times a day., Disp: , Rfl:   •  trimethoprim (TRIMPEX) 100 MG tablet, Take 100 mg by mouth 2 (two) times a day., Disp: , Rfl:         Review of Systems:  A comprehensive 12 point review of systems was obtained from patient and positive as per HPI otherwise negative. +fatigue improved, +chronic joint pain, +bloating, +intermittant edema,       Physical Exam:  Vital Signs: These were reviewed and listed as per patient’s electronic medical chart  Vitals:    10/10/17 1301   BP: 126/65   Pulse: 73   Resp: 18   Temp: 98.8 °F (37.1 °C)   SpO2: 96%     General: Awake, alert and oriented, in no distress, elderly  HEENT: Head is atraumatic, normocephalic, extraocular movements full, oropharynx clear, no scleral  icterus, pink moist mucous membranes  Neck: supple, no jvd, lymphadenopathy or masses  Cardiovascular: regular rate and rhythm without murmurs, rubs or gallops  Pulmonary: non-labored, clear to auscultation bilaterally, no wheezing  Abdomen: soft, non-tender, non-distended, normal active bowel sounds present, no organomegaly  Extremities: No clubbing, cyanosis or edema  Neurologic: Mental status as above, alert, awake and oriented, grossly non-focal exam  Skin: warm, dry, intact, chronic skin lesions  MSK: mobilizes with a cane, upper digit nodules and deviation        Labs / Studies:  LABS:  Results for CHAD WYNNE (MRN [id number]) as of 10/10/2017 13:58   Ref. Range 10/10/2017 13:35   WBC Latest Ref Range: 4.50 - 12.50 10*3/mm3 5.98   RBC Latest Ref Range: 4.20 - 5.40 10*6/mm3 3.04 (L)   Hemoglobin Latest Ref Range: 12.0 - 16.0 g/dL 9.6 (L)   Hematocrit Latest Ref Range: 37.0 - 47.0 % 29.6 (L)   RDW Latest Ref Range: 11.5 - 14.5 % 13.3   MCV Latest Ref Range: 80.0 - 94.0 fL 97.4 (H)   MCH Latest Ref Range: 27.0 - 33.0 pg 31.6   MCHC Latest Ref Range: 33.0 - 37.0 g/dL 32.4 (L)   MPV Latest Ref Range: 6.0 - 10.0 fL 8.7   Platelets Latest Ref Range: 130 - 400 10*3/mm3 229   RDW-SD Latest Ref Range: 37.0 - 54.0 fl 43.6   Neutrophil % Latest Ref Range: 40.0 - 75.0 % 41.0   Lymphocyte % Latest Ref Range: 16.0 - 46.0 % 36.3   Monocyte % Latest Ref Range: 0.0 - 12.0 % 12.2 (H)   Eosinophil % Latest Ref Range: 0.0 - 7.0 % 10.0 (H)   Basophil % Latest Ref Range: 0.0 - 2.0 % 0.3   Immature Grans % Latest Ref Range: 0.0 - 0.5 % 0.2   Neutrophils, Absolute Latest Ref Range: 1.40 - 6.50 10*3/mm3 2.45   Lymphocytes, Absolute Latest Ref Range: 1.00 - 3.00 10*3/mm3 2.17   Monocytes, Absolute Latest Ref Range: 0.10 - 0.90 10*3/mm3 0.73   Eosinophils, Absolute Latest Ref Range: 0.00 - 0.70 10*3/mm3 0.60   Basophils, Absolute Latest Ref Range: 0.00 - 0.30 10*3/mm3 0.02   Immature Grans, Absolute Latest Ref Range: 0.00 - 0.03  10*3/mm3 0.01         PATHOLOGY:  09/02/16: Peripheral Smear: Normocytic anemia with mild anisopoikilocytosis. Normal white blood cell count and morphology, adequate platelets.  02/09/17: Peripheral Smear:          12/19/16: Flow cytometry:       03/29/17: Flow Cytometry:          Assessment/Plan :  Kat Kenney is a very pleasant 85 y.o.  female who presents in follow up appointment for further management and evaluation of normocytic anemia.      Normocytic Anemia  - This is likely multifactorial in which iron deficiency versus anemia of chronic inflammation versus medications are contributing.   - A complete blood count continues to show ongoing anemia however this appears to be stable. She did receive 2 units of packed red blood cells at Dr. Márquez office in 10/2016. Her serum iron and iron saturation were low with a ferritin of 44 during that visit and she likely has some component of iron deficiency along with anemia of chronic inflammation.   - Today her iron panel continues to be within normal limits without supplementation (she has previously taken oral iron and was unable to tolerate the medication).   - Her B12 and folate have also been normal.   - She had a negative GREY and did not show signs of hemolysis. ESR was elevated which is likely secondary to her underlying inflammatory arthritis which can also cause anemia of chronic inflammation and hence bone marrow suppression.   - Given thus far negative workup I did also send for an SPEP which showed no evidence of Mspike and there was no abnormal immunofixation. Her serum free light chains were both elevated with a normal ratio and thus is likely to underlying inflammation.   - It is possible that her inflammatory arthritis is causing inflammation which is suppressing her bone marrow and thus contributing to her anemia. I did also obtain an rheumatoid factor and SUDEEP which were both negative.   - I did also obtain a flow cytometry which was  significant for increased atypical NK cells and was unsure of its significance and could be secondary to a reactive underlying process such as her inflammatory arthritis. I have also discussed with her the possibility of underlying bone marrow process that would require bone marrow biopsy to further evaluate. However at present she would like to continue to monitor. She also was found to have increased eosinophils although eosinophils on differential have been normal with the exception of her previous lab draw and today's evaluation. On repeat flow cytometry NK cells were no longer noted however she continues to have an elevated eosinophil level. Therefore will prescribe Ivermectin in the event that an underlying parasite infection is contributing however this may be related to her underlying allergies which she describes as being severe.   - Given the above abnormalities I have again discussed the possibility of an underlying bone marrow disorder that could be contributing to her anemia, and I have recommended a bone marrow biopsy to further evaluate. However, she does not want to pursue a bone marrow biopsy unless her anemia worsens.     Iron deficiency  - She was previously on iron supplementation however was unable to tolerate  - Currently she is tolerating an oral multivitamin once daily and iron levels have been normal.     Elevated Eosinophils  - I suspect this is likely secondary to her severe underlying allergies that she describes.   - I did also treat for possible underlying parasitic infection with Ivermectin.   - At present while her eosinophils are elevated her absolute eosinophils are normal will monitor at present. If continues to persist will obtain further laboratory work.      I will have the patient return in follow up appointment in 4 months. She understands that should she have any questions or concerns prior to her appointment she should give us a call at any time and I would be happy to see  her sooner. It was a pleasure to see this patient in clinic today, thank you for allowing me to participate in the care of this patient.    I spent 35 minutes in regards to this patient’s care today. More than 27 minutes of the time was spent in direct interaction with the patient for the above problems.        Rosmery Powell MD  10/10/17  1:04 PM

## 2017-10-19 ENCOUNTER — APPOINTMENT (OUTPATIENT)
Dept: MAMMOGRAPHY | Facility: HOSPITAL | Age: 82
End: 2017-10-19

## 2017-10-20 ENCOUNTER — HOSPITAL ENCOUNTER (OUTPATIENT)
Dept: MAMMOGRAPHY | Facility: HOSPITAL | Age: 82
Discharge: HOME | End: 2017-10-20
Admitting: INTERNAL MEDICINE

## 2017-10-20 DIAGNOSIS — Z12.39 BREAST SCREENING: ICD-10-CM

## 2017-10-20 PROCEDURE — 77063 BREAST TOMOSYNTHESIS BI: CPT

## 2017-10-20 PROCEDURE — 77063 BREAST TOMOSYNTHESIS BI: CPT | Performed by: RADIOLOGY

## 2017-10-20 PROCEDURE — G0202 SCR MAMMO BI INCL CAD: HCPCS | Performed by: RADIOLOGY

## 2017-10-20 PROCEDURE — G0202 SCR MAMMO BI INCL CAD: HCPCS

## 2018-02-27 ENCOUNTER — OFFICE VISIT (OUTPATIENT)
Dept: ONCOLOGY | Facility: CLINIC | Age: 83
End: 2018-02-27

## 2018-02-27 VITALS
TEMPERATURE: 97.5 F | WEIGHT: 180.5 LBS | DIASTOLIC BLOOD PRESSURE: 77 MMHG | HEART RATE: 70 BPM | OXYGEN SATURATION: 95 % | SYSTOLIC BLOOD PRESSURE: 137 MMHG | RESPIRATION RATE: 18 BRPM

## 2018-02-27 DIAGNOSIS — R89.8 EOSINOPHIL COUNT RAISED: ICD-10-CM

## 2018-02-27 DIAGNOSIS — D64.9 NORMOCYTIC ANEMIA: Primary | ICD-10-CM

## 2018-02-27 DIAGNOSIS — D50.9 IRON DEFICIENCY ANEMIA, UNSPECIFIED IRON DEFICIENCY ANEMIA TYPE: ICD-10-CM

## 2018-02-27 LAB
ALBUMIN SERPL-MCNC: 3.7 G/DL (ref 3.4–4.8)
ALBUMIN/GLOB SERPL: 1.5 G/DL (ref 1.5–2.5)
ALP SERPL-CCNC: 54 U/L (ref 35–104)
ALT SERPL W P-5'-P-CCNC: 18 U/L (ref 10–36)
ANION GAP SERPL CALCULATED.3IONS-SCNC: 1.7 MMOL/L (ref 3.6–11.2)
AST SERPL-CCNC: 26 U/L (ref 10–30)
BASOPHILS # BLD AUTO: 0.04 10*3/MM3 (ref 0–0.3)
BASOPHILS NFR BLD AUTO: 0.7 % (ref 0–2)
BILIRUB SERPL-MCNC: 0.4 MG/DL (ref 0.2–1.8)
BUN BLD-MCNC: 20 MG/DL (ref 7–21)
BUN/CREAT SERPL: 25.3 (ref 7–25)
CALCIUM SPEC-SCNC: 8.3 MG/DL (ref 7.7–10)
CHLORIDE SERPL-SCNC: 104 MMOL/L (ref 99–112)
CO2 SERPL-SCNC: 29.3 MMOL/L (ref 24.3–31.9)
CREAT BLD-MCNC: 0.79 MG/DL (ref 0.43–1.29)
DEPRECATED RDW RBC AUTO: 44.9 FL (ref 37–54)
EOSINOPHIL # BLD AUTO: 0.88 10*3/MM3 (ref 0–0.7)
EOSINOPHIL NFR BLD AUTO: 16.1 % (ref 0–7)
ERYTHROCYTE [DISTWIDTH] IN BLOOD BY AUTOMATED COUNT: 13.2 % (ref 11.5–14.5)
FERRITIN SERPL-MCNC: 54 NG/ML (ref 10–290.3)
FOLATE SERPL-MCNC: >24 NG/ML (ref 5.4–20)
GFR SERPL CREATININE-BSD FRML MDRD: 69 ML/MIN/1.73
GLOBULIN UR ELPH-MCNC: 2.4 GM/DL
GLUCOSE BLD-MCNC: 94 MG/DL (ref 70–110)
HCT VFR BLD AUTO: 28.8 % (ref 37–47)
HGB BLD-MCNC: 9.2 G/DL (ref 12–16)
IMM GRANULOCYTES # BLD: 0.01 10*3/MM3 (ref 0–0.03)
IMM GRANULOCYTES NFR BLD: 0.2 % (ref 0–0.5)
IRON 24H UR-MRATE: 59 MCG/DL (ref 49–151)
IRON SATN MFR SERPL: 18 % (ref 15–50)
LYMPHOCYTES # BLD AUTO: 1.73 10*3/MM3 (ref 1–3)
LYMPHOCYTES NFR BLD AUTO: 31.6 % (ref 16–46)
MCH RBC QN AUTO: 31.5 PG (ref 27–33)
MCHC RBC AUTO-ENTMCNC: 31.9 G/DL (ref 33–37)
MCV RBC AUTO: 98.6 FL (ref 80–94)
MONOCYTES # BLD AUTO: 0.77 10*3/MM3 (ref 0.1–0.9)
MONOCYTES NFR BLD AUTO: 14.1 % (ref 0–12)
NEUTROPHILS # BLD AUTO: 2.05 10*3/MM3 (ref 1.4–6.5)
NEUTROPHILS NFR BLD AUTO: 37.3 % (ref 40–75)
OSMOLALITY SERPL CALC.SUM OF ELEC: 272.5 MOSM/KG (ref 273–305)
PLATELET # BLD AUTO: 228 10*3/MM3 (ref 130–400)
PMV BLD AUTO: 8.9 FL (ref 6–10)
POTASSIUM BLD-SCNC: 4.9 MMOL/L (ref 3.5–5.3)
PROT SERPL-MCNC: 6.1 G/DL (ref 6–8)
RBC # BLD AUTO: 2.92 10*6/MM3 (ref 4.2–5.4)
SODIUM BLD-SCNC: 135 MMOL/L (ref 135–153)
TIBC SERPL-MCNC: 335 MCG/DL (ref 241–421)
VIT B12 BLD-MCNC: 694 PG/ML (ref 211–911)
WBC NRBC COR # BLD: 5.48 10*3/MM3 (ref 4.5–12.5)

## 2018-02-27 PROCEDURE — 80053 COMPREHEN METABOLIC PANEL: CPT | Performed by: INTERNAL MEDICINE

## 2018-02-27 PROCEDURE — 83540 ASSAY OF IRON: CPT | Performed by: INTERNAL MEDICINE

## 2018-02-27 PROCEDURE — 83550 IRON BINDING TEST: CPT | Performed by: INTERNAL MEDICINE

## 2018-02-27 PROCEDURE — 83883 ASSAY NEPHELOMETRY NOT SPEC: CPT | Performed by: INTERNAL MEDICINE

## 2018-02-27 PROCEDURE — 82607 VITAMIN B-12: CPT | Performed by: INTERNAL MEDICINE

## 2018-02-27 PROCEDURE — 86334 IMMUNOFIX E-PHORESIS SERUM: CPT | Performed by: INTERNAL MEDICINE

## 2018-02-27 PROCEDURE — 84165 PROTEIN E-PHORESIS SERUM: CPT | Performed by: INTERNAL MEDICINE

## 2018-02-27 PROCEDURE — 82746 ASSAY OF FOLIC ACID SERUM: CPT | Performed by: INTERNAL MEDICINE

## 2018-02-27 PROCEDURE — 82728 ASSAY OF FERRITIN: CPT | Performed by: INTERNAL MEDICINE

## 2018-02-27 PROCEDURE — 82784 ASSAY IGA/IGD/IGG/IGM EACH: CPT | Performed by: INTERNAL MEDICINE

## 2018-02-27 PROCEDURE — 99214 OFFICE O/P EST MOD 30 MIN: CPT | Performed by: INTERNAL MEDICINE

## 2018-02-27 PROCEDURE — 85025 COMPLETE CBC W/AUTO DIFF WBC: CPT | Performed by: INTERNAL MEDICINE

## 2018-02-27 NOTE — PROGRESS NOTES
Kat Kenney  [id number]  1/3/1932  2/27/2018      Referring Provider:   Dr. Evelyn Douglas    Reason for Followup:   Normocytic Anemia    Chief Complaint:  Chronic back pain      History of Present Illness:  Kat Kenney is a very pleasant 86 y.o.  female who presents in follow up appointment for further management and evaluation of normocytic anemia.     She was found to have anemia while residing in the nursing home for rehabilitation after knee replacement at the end of 2016. At the time of her initial consultation she had left [facility] and was then living at home and given her persistent anemia she was referred to our clinic for further evaluation. She was previously placed on oral iron for a history of iron deficiency however was unable to tolerate the medication due to intolerance and is now currently taking a multivitamin. She had previously followed with Dr. Morris for possible underlying malignancy but reports that workup was negative (including a bone marrow biopsy) and she had not followed with him in for two years prior to her initial consultation with me with the exception in that she followed with him October 2016 where she received 2 units of packed red blood cells for her anemia. She has not required further blood transfusions. She did also have a bone marrow in 2006 which did reveal a mildly hypercellular marrow for her age with trilineage hematopoiesis.     Interim History:  She denies of any significant changes since her last visit with me. She reports that overall she is feeling rather well with the exception of complaints related to her ongoing arthritic pain. She continues to remain on plaquenil and methotrexate for her inflammatory arthritis. She does have a history of severe allergies in which she has to follow with an allergist for intermittent injections however denies of any allergy related symptoms during her visit today with the exception of chronic sinus drainage. She does  report more fatigue as well as dyspnea with exertion.      The following portions of the patient's history were reviewed and updated as appropriate: allergies, current medications, past family history, past medical history, past social history, past surgical history and problem list.    Allergies   Allergen Reactions   • Benadryl [Diphenhydramine Hcl (Sleep)]    • Levaquin [Levofloxacin]    • Penicillins        Past Medical History:   Diagnosis Date   • Anemia    • Asthma    • Disease of thyroid gland    • GERD (gastroesophageal reflux disease)    • Hypertension    Coronary artery disease s/p stent placement   Sick sinus syndrome  Hypothryoidism  Hyperlipidemia  Inflammatory arthritis    Past Surgical History:   Procedure Laterality Date   • BREAST BIOPSY Left    • HYSTERECTOMY     • REPLACEMENT TOTAL KNEE     • SINUS SURGERY     Pacemaker    Social History     Social History   • Marital status:      Spouse name: N/A   • Number of children: N/A   • Years of education: N/A     Occupational History   • Not on file.     Social History Main Topics   • Smoking status: Never Smoker   • Smokeless tobacco: Never Used   • Alcohol use No   • Drug use: No   • Sexual activity: Not on file     Other Topics Concern   • Not on file     Social History Narrative   She lives by herself.    Family History   Problem Relation Age of Onset   • Breast cancer Sister    • Cancer Brother          Current Outpatient Prescriptions:   •  Albuterol Sulfate (PROAIR HFA IN), Inhale 2 puffs 2 (two) times a day., Disp: , Rfl:   •  aspirin 81 MG EC tablet, Take 81 mg by mouth daily., Disp: , Rfl:   •  atorvastatin (LIPITOR) 20 MG tablet, Take 20 mg by mouth daily., Disp: , Rfl:   •  calcium carbonate (OS-LETY) 600 MG tablet, Take 600 mg by mouth daily., Disp: , Rfl:   •  conjugated estrogens (PREMARIN) 0.625 MG/GM vaginal cream, Insert 0.5 g into the vagina daily., Disp: , Rfl:   •  dicyclomine (BENTYL) 10 MG capsule, Take 10 mg by mouth 4  "(four) times a day before meals and nightly., Disp: , Rfl:   •  DULoxetine (CYMBALTA) 20 MG capsule, Take 20 mg by mouth daily., Disp: , Rfl:   •  fluticasone-salmeterol (ADVAIR DISKUS) 250-50 MCG/DOSE DISKUS, Inhale 1 puff 2 (two) times a day., Disp: , Rfl:   •  folic acid (FOLVITE) 1 MG tablet, Take 1 mg by mouth daily., Disp: , Rfl:   •  hydrochlorothiazide (MICROZIDE) 12.5 MG capsule, Take 12.5 mg by mouth daily., Disp: , Rfl:   •  hydroxychloroquine (PLAQUENIL) 200 MG tablet, Take 200 mg by mouth daily., Disp: , Rfl:   •  levothyroxine (SYNTHROID, LEVOTHROID) 100 MCG tablet, Take 100 mcg by mouth daily., Disp: , Rfl:   •  losartan (COZAAR) 25 MG tablet, Take 25 mg by mouth daily., Disp: , Rfl:   •  methotrexate 2.5 MG tablet, Take 2.5 mg by mouth 1 (one) time per week., Disp: , Rfl:   •  metoprolol tartrate (LOPRESSOR) 50 MG tablet, Take 50 mg by mouth 2 (two) times a day., Disp: , Rfl:   •  O2 (OXYGEN), Inhale 2 L/min every night., Disp: , Rfl:   •  omeprazole (PriLOSEC) 40 MG capsule, Take 40 mg by mouth daily., Disp: , Rfl:   •  Ped Multivitamins-Fl-Iron (MULTIVITAMIN WITH FLUORIDE/IRON) 0.25-10 MG/ML solution solution, Take  by mouth Daily., Disp: , Rfl:   •  Probiotic Product (PROBIOTIC DAILY PO), Take 1 tablet by mouth daily., Disp: , Rfl:   •  sodium chloride (OCEAN) 0.65 % nasal spray, 1 spray into each nostril as needed for congestion., Disp: , Rfl:   •  traMADol (ULTRAM) 50 MG tablet, Take 50 mg by mouth 2 (two) times a day., Disp: , Rfl:   •  trimethoprim (TRIMPEX) 100 MG tablet, Take 100 mg by mouth 2 (two) times a day., Disp: , Rfl:         Review of Systems:  A comprehensive 12 point review of systems was obtained from patient and positive as per HPI otherwise negative. +fatigue improved, +chronic joint pain, occasional sinus drainage, dyspnea with exertion, \"gas pain\" on simethicone, plantars fascitis.       Physical Exam:  Vital Signs: These were reviewed and listed as per patient’s electronic " medical chart  Vitals:    02/27/18 1250   BP: 137/77   Pulse: 70   Resp: 18   Temp: 97.5 °F (36.4 °C)   SpO2: 95%     General: Awake, alert and oriented, in no distress, elderly  HEENT: Head is atraumatic, normocephalic, extraocular movements full, oropharynx clear, no scleral icterus, pink moist mucous membranes  Neck: supple, no jvd, lymphadenopathy or masses  Cardiovascular: regular rate and rhythm without murmurs, rubs or gallops  Pulmonary: non-labored, clear to auscultation bilaterally, no wheezing  Abdomen: soft, non-tender, non-distended, normal active bowel sounds present, no organomegaly  Extremities: No clubbing, cyanosis or edema  Neurologic: Mental status as above, alert, awake and oriented, grossly non-focal exam  Skin: warm, dry, intact, chronic skin lesions  MSK: mobilizes with a cane, upper digit nodules and deviation        Labs / Studies:  LABS:          PATHOLOGY:  09/02/16: Peripheral Smear: Normocytic anemia with mild anisopoikilocytosis. Normal white blood cell count and morphology, adequate platelets.  02/09/17: Peripheral Smear:          12/19/16: Flow cytometry:       03/29/17: Flow Cytometry:          Assessment/Plan :  Kat Kenney is a very pleasant 86 y.o.  female who presents in follow up appointment for further management and evaluation of normocytic anemia.      Normocytic Anemia  - This is likely multifactorial in which iron deficiency versus anemia of chronic inflammation from her arthritis versus medications (MTX and plaquenil) are contributing.   - A complete blood count continues to show ongoing anemia however this appears to be overall stable. She did receive 2 units of packed red blood cells at Dr. Márquez office on 10/2016. Her serum iron and iron saturation were low with a ferritin of 44 during that visit and she likely has some component of iron deficiency along with anemia of chronic inflammation.   - Today her iron panel continues to be within normal limits  without supplementation (she has previously taken oral iron and was unable to tolerate the medication).   - Her B12 and folate have also been normal.   - She had a negative GREY and did not show signs of hemolysis. ESR was elevated which is likely secondary to her underlying inflammatory arthritis which can also cause anemia of chronic inflammation and hence bone marrow suppression.   - Given thus far negative workup I did also send for an SPEP which showed no evidence of Mspike and there was no abnormal immunofixation. Her serum free light chains were both elevated with a normal ratio and can be elevated in the setting of underlying inflammation.   - It is possible that her inflammatory arthritis is causing inflammation which is suppressing her bone marrow and thus contributing to her anemia. I did also obtain an rheumatoid factor and SUDEEP which were both negative.   - I did also obtain a flow cytometry which was significant for increased atypical NK cells and was unsure of its significance and could be secondary to a reactive underlying process such as her inflammatory arthritis. I have also discussed with her the possibility of underlying bone marrow process that would require bone marrow biopsy to further evaluate. However at present she would like to continue to monitor. She also was found to have increased eosinophils. On repeat flow cytometry NK cells were no longer noted however she continued to have an elevated eosinophil level. She was prescribe Ivermectin in the event that an underlying parasite infection is contributing however this may be related to her underlying allergies which she describes as being severe.   - Given the above abnormalities I have again discussed the possibility of an underlying bone marrow disorder that could be contributing to her anemia, and I have recommended a bone marrow biopsy to further evaluate. However, she does not want to pursue a bone marrow biopsy unless her anemia  worsens.     Iron deficiency  - She was previously on iron supplementation however was unable to tolerate  - Currently she is tolerating an oral multivitamin once daily and iron levels have been normal.     Elevated Eosinophils  - I suspect this is likely secondary to her severe underlying allergies that she describes.   - I did also treat for possible underlying parasitic infection with Ivermectin.   - At present her eosinophils continue to be elevated and discussed bone marrow biopsy however at present she would continue to monitor.    I will have the patient return in follow up appointment in 4 months. She understands that should she have any questions or concerns prior to her appointment she should give us a call at any time and I would be happy to see her sooner. It was a pleasure to see this patient in clinic today, thank you for allowing me to participate in the care of this patient.    I spent 27 minutes in regards to this patient’s care today. More than 18 minutes of the time was spent in direct interaction with the patient for the above problems.        Rosmery Powell MD  02/27/18  1:04 PM

## 2018-02-28 LAB
ALBUMIN SERPL-MCNC: 3.2 G/DL (ref 2.9–4.4)
ALBUMIN/GLOB SERPL: 1.2 {RATIO} (ref 0.7–1.7)
ALPHA1 GLOB FLD ELPH-MCNC: 0.2 G/DL (ref 0–0.4)
ALPHA2 GLOB SERPL ELPH-MCNC: 0.7 G/DL (ref 0.4–1)
B-GLOBULIN SERPL ELPH-MCNC: 1 G/DL (ref 0.7–1.3)
GAMMA GLOB SERPL ELPH-MCNC: 0.8 G/DL (ref 0.4–1.8)
GLOBULIN SER CALC-MCNC: 2.8 G/DL (ref 2.2–3.9)
IGA SERPL-MCNC: 248 MG/DL (ref 64–422)
IGG SERPL-MCNC: 786 MG/DL (ref 700–1600)
IGM SERPL-MCNC: 28 MG/DL (ref 26–217)
INTERPRETATION SERPL IEP-IMP: NORMAL
KAPPA LC SERPL-MCNC: 27.2 MG/L (ref 3.3–19.4)
KAPPA LC/LAMBDA SER: 0.86 {RATIO} (ref 0.26–1.65)
LAMBDA LC FREE SERPL-MCNC: 31.6 MG/L (ref 5.7–26.3)
Lab: NORMAL
M-SPIKE: NORMAL G/DL
PROT SERPL-MCNC: 6 G/DL (ref 6–8.5)
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
## 2018-06-19 ENCOUNTER — OFFICE VISIT (OUTPATIENT)
Dept: ONCOLOGY | Facility: CLINIC | Age: 83
End: 2018-06-19

## 2018-06-19 VITALS
WEIGHT: 176.7 LBS | RESPIRATION RATE: 18 BRPM | TEMPERATURE: 98.4 F | DIASTOLIC BLOOD PRESSURE: 70 MMHG | HEART RATE: 71 BPM | SYSTOLIC BLOOD PRESSURE: 130 MMHG | OXYGEN SATURATION: 95 %

## 2018-06-19 DIAGNOSIS — D50.9 IRON DEFICIENCY ANEMIA, UNSPECIFIED IRON DEFICIENCY ANEMIA TYPE: ICD-10-CM

## 2018-06-19 DIAGNOSIS — R89.8 EOSINOPHIL COUNT RAISED: ICD-10-CM

## 2018-06-19 DIAGNOSIS — D64.9 NORMOCYTIC ANEMIA: Primary | ICD-10-CM

## 2018-06-19 DIAGNOSIS — E87.1 HYPONATREMIA: ICD-10-CM

## 2018-06-19 LAB
ALBUMIN SERPL-MCNC: 3.8 G/DL (ref 3.4–4.8)
ALBUMIN/GLOB SERPL: 1.5 G/DL (ref 1.5–2.5)
ALP SERPL-CCNC: 120 U/L (ref 35–104)
ALT SERPL W P-5'-P-CCNC: 14 U/L (ref 10–36)
ANION GAP SERPL CALCULATED.3IONS-SCNC: 5.2 MMOL/L (ref 3.6–11.2)
AST SERPL-CCNC: 23 U/L (ref 10–30)
BASOPHILS # BLD AUTO: 0.03 10*3/MM3 (ref 0–0.3)
BASOPHILS NFR BLD AUTO: 0.6 % (ref 0–2)
BILIRUB SERPL-MCNC: 0.3 MG/DL (ref 0.2–1.8)
BUN BLD-MCNC: 18 MG/DL (ref 7–21)
BUN/CREAT SERPL: 24.7 (ref 7–25)
CALCIUM SPEC-SCNC: 8.3 MG/DL (ref 7.7–10)
CHLORIDE SERPL-SCNC: 96 MMOL/L (ref 99–112)
CO2 SERPL-SCNC: 26.8 MMOL/L (ref 24.3–31.9)
CREAT BLD-MCNC: 0.73 MG/DL (ref 0.43–1.29)
CRP SERPL-MCNC: <0.5 MG/DL (ref 0–0.99)
DEPRECATED RDW RBC AUTO: 46.3 FL (ref 37–54)
EOSINOPHIL # BLD AUTO: 0.39 10*3/MM3 (ref 0–0.7)
EOSINOPHIL NFR BLD AUTO: 7.8 % (ref 0–7)
ERYTHROCYTE [DISTWIDTH] IN BLOOD BY AUTOMATED COUNT: 13.4 % (ref 11.5–14.5)
FERRITIN SERPL-MCNC: 61 NG/ML (ref 10–290.3)
FOLATE SERPL-MCNC: >24 NG/ML (ref 5.4–20)
GFR SERPL CREATININE-BSD FRML MDRD: 76 ML/MIN/1.73
GLOBULIN UR ELPH-MCNC: 2.6 GM/DL
GLUCOSE BLD-MCNC: 97 MG/DL (ref 70–110)
HCT VFR BLD AUTO: 26.4 % (ref 37–47)
HGB BLD-MCNC: 8.7 G/DL (ref 12–16)
IMM GRANULOCYTES # BLD: 0.01 10*3/MM3 (ref 0–0.03)
IMM GRANULOCYTES NFR BLD: 0.2 % (ref 0–0.5)
IRON 24H UR-MRATE: 65 MCG/DL (ref 49–151)
IRON SATN MFR SERPL: 19 % (ref 15–50)
LDH SERPL-CCNC: 205 U/L (ref 135–225)
LYMPHOCYTES # BLD AUTO: 1.56 10*3/MM3 (ref 1–3)
LYMPHOCYTES NFR BLD AUTO: 31.1 % (ref 16–46)
MCH RBC QN AUTO: 31.2 PG (ref 27–33)
MCHC RBC AUTO-ENTMCNC: 33 G/DL (ref 33–37)
MCV RBC AUTO: 94.6 FL (ref 80–94)
MONOCYTES # BLD AUTO: 0.83 10*3/MM3 (ref 0.1–0.9)
MONOCYTES NFR BLD AUTO: 16.5 % (ref 0–12)
NEUTROPHILS # BLD AUTO: 2.2 10*3/MM3 (ref 1.4–6.5)
NEUTROPHILS NFR BLD AUTO: 43.8 % (ref 40–75)
OSMOLALITY SERPL CALC.SUM OF ELEC: 258.9 MOSM/KG (ref 273–305)
PLATELET # BLD AUTO: 259 10*3/MM3 (ref 130–400)
PMV BLD AUTO: 8.6 FL (ref 6–10)
POTASSIUM BLD-SCNC: 4.8 MMOL/L (ref 3.5–5.3)
PROT SERPL-MCNC: 6.4 G/DL (ref 6–8)
RBC # BLD AUTO: 2.79 10*6/MM3 (ref 4.2–5.4)
RETICS #: 0.05 10*6/MM3 (ref 0.02–0.13)
RETICS/RBC NFR AUTO: 1.84 % (ref 0.5–2)
SODIUM BLD-SCNC: 128 MMOL/L (ref 135–153)
TIBC SERPL-MCNC: 342 MCG/DL (ref 241–421)
VIT B12 BLD-MCNC: 668 PG/ML (ref 211–911)
WBC NRBC COR # BLD: 5.02 10*3/MM3 (ref 4.5–12.5)

## 2018-06-19 PROCEDURE — 83540 ASSAY OF IRON: CPT | Performed by: INTERNAL MEDICINE

## 2018-06-19 PROCEDURE — 82728 ASSAY OF FERRITIN: CPT | Performed by: INTERNAL MEDICINE

## 2018-06-19 PROCEDURE — 88184 FLOWCYTOMETRY/ TC 1 MARKER: CPT

## 2018-06-19 PROCEDURE — 83550 IRON BINDING TEST: CPT | Performed by: INTERNAL MEDICINE

## 2018-06-19 PROCEDURE — 83010 ASSAY OF HAPTOGLOBIN QUANT: CPT | Performed by: INTERNAL MEDICINE

## 2018-06-19 PROCEDURE — 82746 ASSAY OF FOLIC ACID SERUM: CPT | Performed by: INTERNAL MEDICINE

## 2018-06-19 PROCEDURE — 88185 FLOWCYTOMETRY/TC ADD-ON: CPT

## 2018-06-19 PROCEDURE — 86140 C-REACTIVE PROTEIN: CPT | Performed by: INTERNAL MEDICINE

## 2018-06-19 PROCEDURE — 82607 VITAMIN B-12: CPT | Performed by: INTERNAL MEDICINE

## 2018-06-19 PROCEDURE — 88189 FLOWCYTOMETRY/READ 16 & >: CPT

## 2018-06-19 PROCEDURE — 85025 COMPLETE CBC W/AUTO DIFF WBC: CPT | Performed by: INTERNAL MEDICINE

## 2018-06-19 PROCEDURE — 80053 COMPREHEN METABOLIC PANEL: CPT | Performed by: INTERNAL MEDICINE

## 2018-06-19 PROCEDURE — 99215 OFFICE O/P EST HI 40 MIN: CPT | Performed by: INTERNAL MEDICINE

## 2018-06-19 PROCEDURE — 85045 AUTOMATED RETICULOCYTE COUNT: CPT | Performed by: INTERNAL MEDICINE

## 2018-06-19 PROCEDURE — 83615 LACTATE (LD) (LDH) ENZYME: CPT | Performed by: INTERNAL MEDICINE

## 2018-06-20 LAB — HAPTOGLOB SERPL-MCNC: 190 MG/DL (ref 34–200)

## 2018-06-21 LAB — LEUK/LYMPH PHENO: NORMAL
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
## 2018-08-22 ENCOUNTER — OFFICE VISIT (OUTPATIENT)
Dept: ONCOLOGY | Facility: CLINIC | Age: 83
End: 2018-08-22

## 2018-08-22 VITALS
OXYGEN SATURATION: 95 % | WEIGHT: 174.5 LBS | RESPIRATION RATE: 18 BRPM | TEMPERATURE: 98 F | DIASTOLIC BLOOD PRESSURE: 64 MMHG | HEART RATE: 81 BPM | SYSTOLIC BLOOD PRESSURE: 144 MMHG

## 2018-08-22 DIAGNOSIS — D50.9 IRON DEFICIENCY ANEMIA, UNSPECIFIED IRON DEFICIENCY ANEMIA TYPE: ICD-10-CM

## 2018-08-22 DIAGNOSIS — E87.1 HYPONATREMIA: ICD-10-CM

## 2018-08-22 DIAGNOSIS — D64.9 NORMOCYTIC ANEMIA: Primary | ICD-10-CM

## 2018-08-22 DIAGNOSIS — C67.9 MALIGNANT NEOPLASM OF URINARY BLADDER, UNSPECIFIED SITE (HCC): ICD-10-CM

## 2018-08-22 DIAGNOSIS — R89.8 EOSINOPHIL COUNT RAISED: ICD-10-CM

## 2018-08-22 LAB
ANION GAP SERPL CALCULATED.3IONS-SCNC: 3.2 MMOL/L (ref 3.6–11.2)
BASOPHILS # BLD AUTO: 0.03 10*3/MM3 (ref 0–0.3)
BASOPHILS NFR BLD AUTO: 0.6 % (ref 0–2)
BUN BLD-MCNC: 20 MG/DL (ref 7–21)
BUN/CREAT SERPL: 24.7 (ref 7–25)
CALCIUM SPEC-SCNC: 8.5 MG/DL (ref 7.7–10)
CHLORIDE SERPL-SCNC: 102 MMOL/L (ref 99–112)
CO2 SERPL-SCNC: 28.8 MMOL/L (ref 24.3–31.9)
CREAT BLD-MCNC: 0.81 MG/DL (ref 0.43–1.29)
DEPRECATED RDW RBC AUTO: 44.4 FL (ref 37–54)
EOSINOPHIL # BLD AUTO: 0.82 10*3/MM3 (ref 0–0.7)
EOSINOPHIL NFR BLD AUTO: 15.6 % (ref 0–7)
ERYTHROCYTE [DISTWIDTH] IN BLOOD BY AUTOMATED COUNT: 13.7 % (ref 11.5–14.5)
FERRITIN SERPL-MCNC: 49 NG/ML (ref 10–290.3)
GFR SERPL CREATININE-BSD FRML MDRD: 67 ML/MIN/1.73
GLUCOSE BLD-MCNC: 108 MG/DL (ref 70–110)
HCT VFR BLD AUTO: 27.9 % (ref 37–47)
HGB BLD-MCNC: 9 G/DL (ref 12–16)
IMM GRANULOCYTES # BLD: 0.01 10*3/MM3 (ref 0–0.03)
IMM GRANULOCYTES NFR BLD: 0.2 % (ref 0–0.5)
IRON 24H UR-MRATE: 63 MCG/DL (ref 49–151)
IRON SATN MFR SERPL: 21 % (ref 15–50)
LYMPHOCYTES # BLD AUTO: 1.29 10*3/MM3 (ref 1–3)
LYMPHOCYTES NFR BLD AUTO: 24.6 % (ref 16–46)
MCH RBC QN AUTO: 31.1 PG (ref 27–33)
MCHC RBC AUTO-ENTMCNC: 32.3 G/DL (ref 33–37)
MCV RBC AUTO: 96.5 FL (ref 80–94)
MONOCYTES # BLD AUTO: 0.61 10*3/MM3 (ref 0.1–0.9)
MONOCYTES NFR BLD AUTO: 11.6 % (ref 0–12)
NEUTROPHILS # BLD AUTO: 2.48 10*3/MM3 (ref 1.4–6.5)
NEUTROPHILS NFR BLD AUTO: 47.4 % (ref 40–75)
OSMOLALITY SERPL CALC.SUM OF ELEC: 271.4 MOSM/KG (ref 273–305)
PLATELET # BLD AUTO: 304 10*3/MM3 (ref 130–400)
PMV BLD AUTO: 8.7 FL (ref 6–10)
POTASSIUM BLD-SCNC: 4.9 MMOL/L (ref 3.5–5.3)
RBC # BLD AUTO: 2.89 10*6/MM3 (ref 4.2–5.4)
SODIUM BLD-SCNC: 134 MMOL/L (ref 135–153)
TIBC SERPL-MCNC: 295 MCG/DL (ref 241–421)
WBC NRBC COR # BLD: 5.24 10*3/MM3 (ref 4.5–12.5)

## 2018-08-22 PROCEDURE — 99214 OFFICE O/P EST MOD 30 MIN: CPT | Performed by: INTERNAL MEDICINE

## 2018-08-22 PROCEDURE — 85025 COMPLETE CBC W/AUTO DIFF WBC: CPT | Performed by: INTERNAL MEDICINE

## 2018-08-22 PROCEDURE — 82728 ASSAY OF FERRITIN: CPT | Performed by: INTERNAL MEDICINE

## 2018-08-22 PROCEDURE — 80048 BASIC METABOLIC PNL TOTAL CA: CPT | Performed by: INTERNAL MEDICINE

## 2018-08-22 PROCEDURE — 83550 IRON BINDING TEST: CPT | Performed by: INTERNAL MEDICINE

## 2018-08-22 PROCEDURE — 83540 ASSAY OF IRON: CPT | Performed by: INTERNAL MEDICINE

## 2018-10-22 ENCOUNTER — HOSPITAL ENCOUNTER (OUTPATIENT)
Dept: MAMMOGRAPHY | Facility: HOSPITAL | Age: 83
Discharge: HOME | End: 2018-10-22
Admitting: INTERNAL MEDICINE

## 2018-10-22 ENCOUNTER — LAB (OUTPATIENT)
Dept: ONCOLOGY | Facility: CLINIC | Age: 83
End: 2018-10-22

## 2018-10-22 VITALS
DIASTOLIC BLOOD PRESSURE: 64 MMHG | HEART RATE: 71 BPM | SYSTOLIC BLOOD PRESSURE: 141 MMHG | RESPIRATION RATE: 18 BRPM | TEMPERATURE: 97.6 F | OXYGEN SATURATION: 97 %

## 2018-10-22 DIAGNOSIS — D64.9 NORMOCYTIC ANEMIA: ICD-10-CM

## 2018-10-22 DIAGNOSIS — Z12.39 SCREENING BREAST EXAMINATION: ICD-10-CM

## 2018-10-22 DIAGNOSIS — D50.9 IRON DEFICIENCY ANEMIA, UNSPECIFIED IRON DEFICIENCY ANEMIA TYPE: ICD-10-CM

## 2018-10-22 LAB
BASOPHILS # BLD AUTO: 0.04 10*3/MM3 (ref 0–0.3)
BASOPHILS NFR BLD AUTO: 0.7 % (ref 0–2)
DEPRECATED RDW RBC AUTO: 49.7 FL (ref 37–54)
EOSINOPHIL # BLD AUTO: 0.66 10*3/MM3 (ref 0–0.7)
EOSINOPHIL NFR BLD AUTO: 11.8 % (ref 0–7)
ERYTHROCYTE [DISTWIDTH] IN BLOOD BY AUTOMATED COUNT: 14.9 % (ref 11.5–14.5)
HCT VFR BLD AUTO: 31.2 % (ref 37–47)
HGB BLD-MCNC: 9.6 G/DL (ref 12–16)
IMM GRANULOCYTES # BLD: 0.01 10*3/MM3 (ref 0–0.03)
IMM GRANULOCYTES NFR BLD: 0.2 % (ref 0–0.5)
LYMPHOCYTES # BLD AUTO: 1.91 10*3/MM3 (ref 1–3)
LYMPHOCYTES NFR BLD AUTO: 34.2 % (ref 16–46)
MCH RBC QN AUTO: 30.1 PG (ref 27–33)
MCHC RBC AUTO-ENTMCNC: 30.8 G/DL (ref 33–37)
MCV RBC AUTO: 97.8 FL (ref 80–94)
MONOCYTES # BLD AUTO: 0.72 10*3/MM3 (ref 0.1–0.9)
MONOCYTES NFR BLD AUTO: 12.9 % (ref 0–12)
NEUTROPHILS # BLD AUTO: 2.24 10*3/MM3 (ref 1.4–6.5)
NEUTROPHILS NFR BLD AUTO: 40.2 % (ref 40–75)
PLATELET # BLD AUTO: 231 10*3/MM3 (ref 130–400)
PMV BLD AUTO: 9.3 FL (ref 6–10)
RBC # BLD AUTO: 3.19 10*6/MM3 (ref 4.2–5.4)
WBC NRBC COR # BLD: 5.58 10*3/MM3 (ref 4.5–12.5)

## 2018-10-22 PROCEDURE — 77063 BREAST TOMOSYNTHESIS BI: CPT | Performed by: RADIOLOGY

## 2018-10-22 PROCEDURE — 77067 SCR MAMMO BI INCL CAD: CPT | Performed by: RADIOLOGY

## 2018-10-22 PROCEDURE — 85025 COMPLETE CBC W/AUTO DIFF WBC: CPT | Performed by: INTERNAL MEDICINE

## 2018-10-22 PROCEDURE — 77063 BREAST TOMOSYNTHESIS BI: CPT

## 2018-10-22 PROCEDURE — 77067 SCR MAMMO BI INCL CAD: CPT

## 2018-12-12 ENCOUNTER — OFFICE VISIT (OUTPATIENT)
Dept: ONCOLOGY | Facility: CLINIC | Age: 83
End: 2018-12-12

## 2018-12-12 VITALS
SYSTOLIC BLOOD PRESSURE: 123 MMHG | OXYGEN SATURATION: 95 % | DIASTOLIC BLOOD PRESSURE: 56 MMHG | HEART RATE: 71 BPM | RESPIRATION RATE: 18 BRPM | WEIGHT: 167.4 LBS | TEMPERATURE: 97 F

## 2018-12-12 DIAGNOSIS — D50.9 IRON DEFICIENCY ANEMIA, UNSPECIFIED IRON DEFICIENCY ANEMIA TYPE: ICD-10-CM

## 2018-12-12 DIAGNOSIS — Z01.89 ENCOUNTER FOR OTHER SPECIFIED SPECIAL EXAMINATIONS: ICD-10-CM

## 2018-12-12 DIAGNOSIS — C67.9 MALIGNANT NEOPLASM OF URINARY BLADDER, UNSPECIFIED SITE (HCC): ICD-10-CM

## 2018-12-12 DIAGNOSIS — R89.8 EOSINOPHIL COUNT RAISED: ICD-10-CM

## 2018-12-12 DIAGNOSIS — D64.9 NORMOCYTIC ANEMIA: Primary | ICD-10-CM

## 2018-12-12 LAB
BASOPHILS # BLD AUTO: 0.02 10*3/MM3 (ref 0–0.3)
BASOPHILS NFR BLD AUTO: 0.4 % (ref 0–2)
DEPRECATED RDW RBC AUTO: 53.8 FL (ref 37–54)
EOSINOPHIL # BLD AUTO: 0.38 10*3/MM3 (ref 0–0.7)
EOSINOPHIL NFR BLD AUTO: 6.7 % (ref 0–7)
ERYTHROCYTE [DISTWIDTH] IN BLOOD BY AUTOMATED COUNT: 15.3 % (ref 11.5–14.5)
FERRITIN SERPL-MCNC: 52 NG/ML (ref 10–290.3)
HAV IGM SERPL QL IA: NORMAL
HBV CORE IGM SERPL QL IA: NORMAL
HBV SURFACE AG SERPL QL IA: NORMAL
HCT VFR BLD AUTO: 31.1 % (ref 37–47)
HCV AB SER DONR QL: NORMAL
HGB BLD-MCNC: 10.1 G/DL (ref 12–16)
IMM GRANULOCYTES # BLD: 0.01 10*3/MM3 (ref 0–0.03)
IMM GRANULOCYTES NFR BLD: 0.2 % (ref 0–0.5)
IRON 24H UR-MRATE: 108 MCG/DL (ref 49–151)
IRON SATN MFR SERPL: 33 % (ref 15–50)
LYMPHOCYTES # BLD AUTO: 1.54 10*3/MM3 (ref 1–3)
LYMPHOCYTES NFR BLD AUTO: 27.1 % (ref 16–46)
MCH RBC QN AUTO: 31.6 PG (ref 27–33)
MCHC RBC AUTO-ENTMCNC: 32.5 G/DL (ref 33–37)
MCV RBC AUTO: 97.2 FL (ref 80–94)
MONOCYTES # BLD AUTO: 0.6 10*3/MM3 (ref 0.1–0.9)
MONOCYTES NFR BLD AUTO: 10.6 % (ref 0–12)
NEUTROPHILS # BLD AUTO: 3.13 10*3/MM3 (ref 1.4–6.5)
NEUTROPHILS NFR BLD AUTO: 55 % (ref 40–75)
PLATELET # BLD AUTO: 216 10*3/MM3 (ref 130–400)
PMV BLD AUTO: 9.2 FL (ref 6–10)
RBC # BLD AUTO: 3.2 10*6/MM3 (ref 4.2–5.4)
TIBC SERPL-MCNC: 325 MCG/DL (ref 241–421)
WBC NRBC COR # BLD: 5.68 10*3/MM3 (ref 4.5–12.5)

## 2018-12-12 PROCEDURE — 85025 COMPLETE CBC W/AUTO DIFF WBC: CPT | Performed by: INTERNAL MEDICINE

## 2018-12-12 PROCEDURE — 82525 ASSAY OF COPPER: CPT | Performed by: INTERNAL MEDICINE

## 2018-12-12 PROCEDURE — 83540 ASSAY OF IRON: CPT | Performed by: INTERNAL MEDICINE

## 2018-12-12 PROCEDURE — 83550 IRON BINDING TEST: CPT | Performed by: INTERNAL MEDICINE

## 2018-12-12 PROCEDURE — 82728 ASSAY OF FERRITIN: CPT | Performed by: INTERNAL MEDICINE

## 2018-12-12 PROCEDURE — 99214 OFFICE O/P EST MOD 30 MIN: CPT | Performed by: INTERNAL MEDICINE

## 2018-12-12 PROCEDURE — 80074 ACUTE HEPATITIS PANEL: CPT | Performed by: INTERNAL MEDICINE

## 2018-12-12 PROCEDURE — 86038 ANTINUCLEAR ANTIBODIES: CPT | Performed by: INTERNAL MEDICINE

## 2018-12-12 PROCEDURE — 84630 ASSAY OF ZINC: CPT | Performed by: INTERNAL MEDICINE

## 2018-12-12 NOTE — PROGRESS NOTES
Kat Kenney  [id number]  1/3/1932  12/12/2018      Referring Provider:   Dr. Evelyn Douglas    Reason for Followup:   Normocytic Anemia    Chief Complaint:  Fatigue improved      History of Present Illness:  Kat Kenney is a very pleasant 86 y.o.  female who presents in follow up appointment for further management and evaluation of normocytic anemia.     She was found to have anemia while residing in the nursing home for rehabilitation after knee replacement at the end of 2016. At the time of her initial consultation she had left [facility] and was then living at home and given her persistent anemia she was referred to our clinic for further evaluation. She was previously placed on oral iron for a history of iron deficiency however was unable to tolerate the medication due to intolerance and is now currently taking a multivitamin containing iron. She had previously followed with Dr. Morris for possible underlying malignancy but reports that workup was negative (including a bone marrow biopsy) and she had not followed with him in for two years prior to her initial consultation with me with the exception in that she followed with him October 2016 where she received 2 units of packed red blood cells for her anemia. She has not required further blood transfusions since that time. She did also have a bone marrow in 2006 which did reveal a mildly hypercellular marrow for her age with trilineage hematopoiesis. She also has been following with Dr. Kenney for hematuria and frequent urinary tract infections and underwent a cystoscopy as well as TURBT and pathology was significant for bladder cancer with no detrusor invasion. Chemotherapy or radiation was not recommended and she follows with Dr. Kenney every 3 months and recently underwent imaging that was negative.      Interim History:  She denies of any further hematuria however notes that she was treated for a bladder infection. She was also treated for an URI and  at that time had decreased appetite due to taste change and as a result suffered some some weight lost. However she recently has regained some weight since feeling better. She denies of any fever however has had a non productive cough. She continues to be on a MVI that contains iron as she is unable to tolerate oral iron.       The following portions of the patient's history were reviewed and updated as appropriate: allergies, current medications, past family history, past medical history, past social history, past surgical history and problem list.    Allergies   Allergen Reactions   • Benadryl [Diphenhydramine Hcl (Sleep)]    • Levaquin [Levofloxacin]    • Penicillins        Past Medical History:   Diagnosis Date   • Anemia    • Asthma    • Disease of thyroid gland    • GERD (gastroesophageal reflux disease)    • Hypertension    Coronary artery disease s/p stent placement   Sick sinus syndrome  Hypothryoidism  Hyperlipidemia  Inflammatory arthritis  Bladder Cancer completely excised     Past Surgical History:   Procedure Laterality Date   • BLADDER TUMOR EXCISION  07/30/2018   • BREAST BIOPSY Left    • HYSTERECTOMY     • REPLACEMENT TOTAL KNEE     • SINUS SURGERY     Pacemaker    Social History     Socioeconomic History   • Marital status:      Spouse name: Not on file   • Number of children: Not on file   • Years of education: Not on file   • Highest education level: Not on file   Social Needs   • Financial resource strain: Not on file   • Food insecurity - worry: Not on file   • Food insecurity - inability: Not on file   • Transportation needs - medical: Not on file   • Transportation needs - non-medical: Not on file   Occupational History   • Not on file   Tobacco Use   • Smoking status: Never Smoker   • Smokeless tobacco: Never Used   Substance and Sexual Activity   • Alcohol use: No   • Drug use: No   • Sexual activity: Not on file   Other Topics Concern   • Not on file   Social History Narrative   •  Not on file   She lives by herself.    Family History   Problem Relation Age of Onset   • Breast cancer Sister    • Cancer Brother          Current Outpatient Medications:   •  Albuterol Sulfate (PROAIR HFA IN), Inhale 2 puffs 2 (two) times a day., Disp: , Rfl:   •  aspirin 81 MG EC tablet, Take 81 mg by mouth daily., Disp: , Rfl:   •  atorvastatin (LIPITOR) 20 MG tablet, Take 20 mg by mouth daily., Disp: , Rfl:   •  calcium carbonate (OS-LETY) 600 MG tablet, Take 600 mg by mouth daily., Disp: , Rfl:   •  conjugated estrogens (PREMARIN) 0.625 MG/GM vaginal cream, Insert 0.5 g into the vagina daily., Disp: , Rfl:   •  fluticasone-salmeterol (ADVAIR DISKUS) 250-50 MCG/DOSE DISKUS, Inhale 1 puff 2 (two) times a day., Disp: , Rfl:   •  folic acid (FOLVITE) 1 MG tablet, Take 1 mg by mouth daily., Disp: , Rfl:   •  hydrochlorothiazide (MICROZIDE) 12.5 MG capsule, Take 12.5 mg by mouth daily., Disp: , Rfl:   •  levothyroxine (SYNTHROID, LEVOTHROID) 100 MCG tablet, Take 100 mcg by mouth daily., Disp: , Rfl:   •  losartan (COZAAR) 25 MG tablet, Take 25 mg by mouth daily., Disp: , Rfl:   •  methotrexate 2.5 MG tablet, Take 2.5 mg by mouth 1 (one) time per week., Disp: , Rfl:   •  metoprolol tartrate (LOPRESSOR) 50 MG tablet, Take 50 mg by mouth 2 (two) times a day., Disp: , Rfl:   •  O2 (OXYGEN), Inhale 2 L/min every night., Disp: , Rfl:   •  omeprazole (PriLOSEC) 40 MG capsule, Take 40 mg by mouth daily., Disp: , Rfl:   •  Ped Multivitamins-Fl-Iron (MULTIVITAMIN WITH FLUORIDE/IRON) 0.25-10 MG/ML solution solution, Take  by mouth Daily., Disp: , Rfl:   •  Probiotic Product (PROBIOTIC DAILY PO), Take 1 tablet by mouth daily., Disp: , Rfl:   •  sodium chloride (OCEAN) 0.65 % nasal spray, 1 spray into each nostril as needed for congestion., Disp: , Rfl:   •  trimethoprim (TRIMPEX) 100 MG tablet, Take 100 mg by mouth 2 (two) times a day., Disp: , Rfl:         Review of Systems:  Pertinent positives are listed as per history of  present of illness, all other systems reviewed and are negative.      Physical Exam:  Vital Signs: These were reviewed and listed as per patient’s electronic medical chart  Vitals:    12/12/18 1300   BP: 123/56   Pulse: 71   Resp: 18   Temp: 97 °F (36.1 °C)   SpO2: 95%     General: Awake, alert and oriented, in no distress, elderly  HEENT: Head is atraumatic, normocephalic, extraocular movements full, oropharynx clear, no scleral icterus, pink moist mucous membranes, hard of hearing   Neck: supple, no jvd, lymphadenopathy or masses  Cardiovascular: regular rate and rhythm without murmurs, rubs or gallops  Pulmonary: non-labored, clear to auscultation bilaterally, no wheezing  Abdomen: soft, non-tender, non-distended, normal active bowel sounds present  Extremities: No clubbing, cyanosis or edema  Neurologic: Mental status as above, alert, awake and oriented, grossly non-focal exam with exception of difficulty in hearing  Skin: warm, dry, intact, chronic skin lesions  MSK: mobilizes with a cane   LN: no cervical or supraclavicular LNs  Patient examined on 12/12/18 and changes to examination reflected and noted above      Labs / Studies:  LABS:                    PATHOLOGY:  09/02/16: Peripheral Smear: Normocytic anemia with mild anisopoikilocytosis. Normal white blood cell count and morphology, adequate platelets.  02/09/17: Peripheral Smear:          12/19/16: Flow cytometry:       03/29/17: Flow Cytometry:        06/21/18: Flow Cytometry:          Assessment/Plan :  Kat Kenney is a very pleasant 86 y.o.  female who presents in follow up appointment for further management and evaluation of normocytic anemia.      Normocytic Anemia  - This is likely multifactorial and secondary to iron deficiency versus anemia of chronic inflammation from her arthritis versus medications (MTX) versus recent blood loss from hematuria which are all likely contributing.   - A complete blood count continues to show ongoing  anemia however this appears to be stable in the 9 range and is improved today at 10.1. She did receive 2 units of packed red blood cells at Dr. Márquez office on 10/2016. Her serum iron and iron saturation were low with a ferritin of 44 during that visit and she likely has some component of iron deficiency along with anemia of chronic inflammation.   - Today her iron panel continues to be within normal limits without supplementation (she is only on a MVI containing iron as she has previously taken oral iron and was unable to tolerate the medication).   - Her B12 and folate have also been normal.   - She had a negative GREY and did not show signs of hemolysis. ESR was elevated which is likely secondary to her underlying inflammatory arthritis which can also cause anemia of chronic inflammation and hence bone marrow suppression.   - Given thus far negative workup I did also send for an SPEP which showed no evidence of Mspike and there was no abnormal immunofixation. Her serum free light chains were both elevated with a normal ratio and can be elevated in the setting of underlying inflammation.   - It is possible that her inflammatory arthritis is causing inflammation which is suppressing her bone marrow and thus contributing to her anemia. I did also obtain an rheumatoid factor and SUDEEP which were both negative.   - I did also obtain a flow cytometry which was significant for increased atypical NK cells and was unsure of its significance increase in eosinophils and could be secondary to a reactive underlying process such as her inflammatory arthritis vs allergies vs neoplastic. I have also discussed with her the possibility of underlying bone marrow process that would require bone marrow biopsy to further evaluate. However at present she would like to continue to monitor and does not want to pursue bone marrrow.  On repeat flow cytometry NK cells were no longer noted however she continued to have an elevated eosinophil  level and further discussed below.   - Given the above abnormalities I have again discussed the possibility of an underlying bone marrow disorder that could be contributing to her anemia, and I have recommended a bone marrow biopsy to further evaluate. However, she does not want to pursue a bone marrow biopsy.   - Given her complaints of worsening anemia, I did repeat workup which was essentially negative, however her worsening anemia have now may been due to her hematuria and underlying bladder malignancy which has improved today.    Bladder Malignancy  - Patient reports this was completely excised without muscular involvement and adjuvant therapy with chemotherapy or radiation was not recommended.  - She follows with Dr. Kenney every 3 months with repeat imaging.    Iron deficiency  - She was previously on iron supplementation however was unable to tolerate  - Currently she is tolerating an oral multivitamin once daily and iron levels have been normal. Copper, zinc are pending.    Elevated Eosinophils  - I suspect this is likely secondary to her severe underlying allergies versus bladder cancer  - She was treated empirically for an underlying parasitic infection with Ivermectin.   - At present her eosinophils have normalized, last flow cytometry obtained was significant for elevation in eosinophils and we discussed bone marrow biopsy however at present she does not want further workup, acute hepatitis panel and SUDEEP pending.    ACO Quality measures  - Kat Kenney does not use tobacco products.  - Patient's is slightly overweight and we discussed exercise and diet and she unintentionally lost weight and regained some of that weight however has overall had a 9 pound weight loss.  - Current outpatient and discharge medications have been reconciled for the patient.  Reviewed by: Rosmery Powell MD        I will have the patient return in follow up appointment in 4 months. She understands that should she have any  questions or concerns prior to her appointment she should give us a call at any time and I would be happy to see her sooner. It was a pleasure to see this patient in clinic today, thank you for allowing me to participate in the care of this patient.      Rosmery Powell MD  12/12/18  1:30 PM

## 2018-12-13 LAB — ANA SER QL: NEGATIVE

## 2018-12-14 LAB
COPPER SERPL-MCNC: 97 UG/DL (ref 72–166)
ZINC SERPL-MCNC: 51 UG/DL (ref 56–134)

## 2018-12-21 RX ORDER — ZINC GLUCONATE 50 MG
50 TABLET ORAL DAILY
Qty: 90 TABLET | Refills: 0 | Status: SHIPPED | OUTPATIENT
Start: 2018-12-21 | End: 2019-03-21

## 2019-05-23 ENCOUNTER — OFFICE VISIT (OUTPATIENT)
Dept: ONCOLOGY | Facility: CLINIC | Age: 84
End: 2019-05-23

## 2019-05-23 VITALS
RESPIRATION RATE: 20 BRPM | TEMPERATURE: 98.3 F | WEIGHT: 158.2 LBS | HEART RATE: 76 BPM | DIASTOLIC BLOOD PRESSURE: 68 MMHG | SYSTOLIC BLOOD PRESSURE: 140 MMHG | OXYGEN SATURATION: 99 %

## 2019-05-23 DIAGNOSIS — D64.9 NORMOCYTIC ANEMIA: Primary | ICD-10-CM

## 2019-05-23 DIAGNOSIS — D50.9 IRON DEFICIENCY ANEMIA, UNSPECIFIED IRON DEFICIENCY ANEMIA TYPE: ICD-10-CM

## 2019-05-23 DIAGNOSIS — E60 ZINC DEFICIENCY: ICD-10-CM

## 2019-05-23 LAB
BASOPHILS # BLD AUTO: 0.03 10*3/MM3 (ref 0–0.2)
BASOPHILS NFR BLD AUTO: 0.5 % (ref 0–1.5)
CRP SERPL-MCNC: 0.04 MG/DL (ref 0–0.5)
DEPRECATED RDW RBC AUTO: 46.3 FL (ref 37–54)
EOSINOPHIL # BLD AUTO: 0.66 10*3/MM3 (ref 0–0.4)
EOSINOPHIL NFR BLD AUTO: 11.7 % (ref 0.3–6.2)
ERYTHROCYTE [DISTWIDTH] IN BLOOD BY AUTOMATED COUNT: 14 % (ref 12.3–15.4)
FERRITIN SERPL-MCNC: 105.7 NG/ML (ref 13–150)
FOLATE SERPL-MCNC: >20 NG/ML (ref 4.78–24.2)
HCT VFR BLD AUTO: 32.9 % (ref 34–46.6)
HGB BLD-MCNC: 10.6 G/DL (ref 12–15.9)
IMM GRANULOCYTES # BLD AUTO: 0 10*3/MM3 (ref 0–0.05)
IMM GRANULOCYTES NFR BLD AUTO: 0 % (ref 0–0.5)
IRON 24H UR-MRATE: 74 MCG/DL (ref 37–145)
IRON SATN MFR SERPL: 21 % (ref 20–50)
LYMPHOCYTES # BLD AUTO: 1.94 10*3/MM3 (ref 0.7–3.1)
LYMPHOCYTES NFR BLD AUTO: 34.5 % (ref 19.6–45.3)
MCH RBC QN AUTO: 31.3 PG (ref 26.6–33)
MCHC RBC AUTO-ENTMCNC: 32.2 G/DL (ref 31.5–35.7)
MCV RBC AUTO: 97.1 FL (ref 79–97)
MONOCYTES # BLD AUTO: 0.64 10*3/MM3 (ref 0.1–0.9)
MONOCYTES NFR BLD AUTO: 11.4 % (ref 5–12)
NEUTROPHILS # BLD AUTO: 2.36 10*3/MM3 (ref 1.7–7)
NEUTROPHILS NFR BLD AUTO: 41.9 % (ref 42.7–76)
PLATELET # BLD AUTO: 232 10*3/MM3 (ref 140–450)
PMV BLD AUTO: 8.6 FL (ref 6–12)
RBC # BLD AUTO: 3.39 10*6/MM3 (ref 3.77–5.28)
TIBC SERPL-MCNC: 359 MCG/DL (ref 298–536)
TRANSFERRIN SERPL-MCNC: 241 MG/DL (ref 200–360)
VIT B12 BLD-MCNC: 825 PG/ML (ref 211–946)
WBC NRBC COR # BLD: 5.63 10*3/MM3 (ref 3.4–10.8)

## 2019-05-23 PROCEDURE — 82607 VITAMIN B-12: CPT | Performed by: INTERNAL MEDICINE

## 2019-05-23 PROCEDURE — 82746 ASSAY OF FOLIC ACID SERUM: CPT | Performed by: INTERNAL MEDICINE

## 2019-05-23 PROCEDURE — 85025 COMPLETE CBC W/AUTO DIFF WBC: CPT | Performed by: INTERNAL MEDICINE

## 2019-05-23 PROCEDURE — 84630 ASSAY OF ZINC: CPT | Performed by: INTERNAL MEDICINE

## 2019-05-23 PROCEDURE — 86140 C-REACTIVE PROTEIN: CPT | Performed by: INTERNAL MEDICINE

## 2019-05-23 PROCEDURE — 82728 ASSAY OF FERRITIN: CPT | Performed by: INTERNAL MEDICINE

## 2019-05-23 PROCEDURE — 84466 ASSAY OF TRANSFERRIN: CPT | Performed by: INTERNAL MEDICINE

## 2019-05-23 PROCEDURE — 99214 OFFICE O/P EST MOD 30 MIN: CPT | Performed by: INTERNAL MEDICINE

## 2019-05-23 PROCEDURE — 82525 ASSAY OF COPPER: CPT | Performed by: INTERNAL MEDICINE

## 2019-05-23 PROCEDURE — 83540 ASSAY OF IRON: CPT | Performed by: INTERNAL MEDICINE

## 2019-05-23 RX ORDER — OXYBUTYNIN CHLORIDE 5 MG/1
5 TABLET, EXTENDED RELEASE ORAL 2 TIMES DAILY
COMMUNITY

## 2019-05-23 RX ORDER — ATENOLOL 25 MG/1
25 TABLET ORAL DAILY
COMMUNITY

## 2019-05-23 RX ORDER — ROPINIROLE 0.25 MG/1
0.25 TABLET, FILM COATED ORAL NIGHTLY
COMMUNITY

## 2019-05-23 NOTE — PROGRESS NOTES
Kat Kenney  [id number]  1/3/1932  5/23/2019      Referring Provider:   Dr. Evelyn Douglas    Reason for Followup:   Normocytic Anemia    Chief Complaint:  Normocytic Anemia      History of Present Illness:  Kat Kenney is a very pleasant 87 y.o.  female who presents in follow up appointment for further management and evaluation of normocytic anemia.     She was found to have anemia while residing in the nursing home for rehabilitation after knee replacement at the end of 2016. At the time of her initial consultation she had left [facility] and was then living at home and given her persistent anemia she was referred to our clinic for further evaluation. She was previously placed on oral iron for a history of iron deficiency however was unable to tolerate the medication due to intolerance and is now currently taking a multivitamin containing iron. She had previously followed with Dr. Morris for possible underlying malignancy but reports that workup was negative (including a bone marrow biopsy) and she had not followed with him in for two years prior to her initial consultation with me with the exception in that she followed with him October 2016 where she received 2 units of packed red blood cells for her anemia. She has not required further blood transfusions since that time. She did also have a bone marrow in 2006 which did reveal a mildly hypercellular marrow for her age with trilineage hematopoiesis. She also has been following with Dr. Kenney for hematuria and frequent urinary tract infections and underwent a cystoscopy as well as TURBT and pathology was significant for bladder cancer with no detrusor invasion. Chemotherapy or radiation was not recommended and she follows with Dr. Kenney every 3 months and recently underwent imaging that was negative.      Interim History:  She continues to follow with Dr. Kenney every three months with imaging and notes that everything is going well. She has no further  episodes of hematuria or any abnormal bleeding. She has had ongoing weight loss since last August and attributes this to a combination of things. She was experiencing decreased appetite due to stress (grandson was recently diagnosed with leukemia last August) and her dentures were causing pain and she recently got these adjusted this week. Her synthroid has also recently been adjusted in the event this was contributing. She has had ongoing UTIs and has been on antibiotics which is being managed by her primary physician and Urologist. She has follow up with Dr. Kenney next month. She continues to remain on an MVI for iron deficiency (she is unable to tolerate oral iron) as well as for zinc deficiency and is on separate folic acid.          The following portions of the patient's history were reviewed and updated as appropriate: allergies, current medications, past family history, past medical history, past social history, past surgical history and problem list.    Allergies   Allergen Reactions   • Benadryl [Diphenhydramine Hcl (Sleep)]    • Levaquin [Levofloxacin]    • Penicillins        Past Medical History:   Diagnosis Date   • Anemia    • Asthma    • Disease of thyroid gland    • GERD (gastroesophageal reflux disease)    • Hypertension    Coronary artery disease s/p stent placement   Sick sinus syndrome  Hypothryoidism  Hyperlipidemia  Inflammatory arthritis  Bladder Cancer completely excised     Past Surgical History:   Procedure Laterality Date   • BLADDER TUMOR EXCISION  07/30/2018   • BREAST BIOPSY Left    • HYSTERECTOMY     • REPLACEMENT TOTAL KNEE     • SINUS SURGERY     Pacemaker    Social History     Socioeconomic History   • Marital status:      Spouse name: Not on file   • Number of children: Not on file   • Years of education: Not on file   • Highest education level: Not on file   Tobacco Use   • Smoking status: Never Smoker   • Smokeless tobacco: Never Used   Substance and Sexual Activity   •  Alcohol use: No   • Drug use: No   She lives by herself.    Family History   Problem Relation Age of Onset   • Breast cancer Sister    • Cancer Brother          Current Outpatient Medications:   •  Albuterol Sulfate (PROAIR HFA IN), Inhale 2 puffs 2 (two) times a day., Disp: , Rfl:   •  aspirin 81 MG EC tablet, Take 81 mg by mouth daily., Disp: , Rfl:   •  atenolol (TENORMIN) 25 MG tablet, Take 25 mg by mouth Daily., Disp: , Rfl:   •  atorvastatin (LIPITOR) 20 MG tablet, Take 20 mg by mouth daily., Disp: , Rfl:   •  conjugated estrogens (PREMARIN) 0.625 MG/GM vaginal cream, Insert 0.5 g into the vagina daily., Disp: , Rfl:   •  Fluticasone Furoate-Vilanterol (BREO ELLIPTA IN), Inhale., Disp: , Rfl:   •  folic acid (FOLVITE) 1 MG tablet, Take 1 mg by mouth daily., Disp: , Rfl:   •  hydrochlorothiazide (MICROZIDE) 12.5 MG capsule, Take 12.5 mg by mouth daily., Disp: , Rfl:   •  levothyroxine (SYNTHROID, LEVOTHROID) 100 MCG tablet, Take 100 mcg by mouth daily., Disp: , Rfl:   •  losartan (COZAAR) 25 MG tablet, Take 25 mg by mouth daily., Disp: , Rfl:   •  methotrexate 2.5 MG tablet, Take 2.5 mg by mouth 1 (one) time per week., Disp: , Rfl:   •  O2 (OXYGEN), Inhale 2 L/min every night., Disp: , Rfl:   •  omeprazole (PriLOSEC) 40 MG capsule, Take 40 mg by mouth daily., Disp: , Rfl:   •  oxybutynin XL (DITROPAN-XL) 5 MG 24 hr tablet, Take 5 mg by mouth 2 (Two) Times a Day., Disp: , Rfl:   •  Ped Multivitamins-Fl-Iron (MULTIVITAMIN WITH FLUORIDE/IRON) 0.25-10 MG/ML solution solution, Take  by mouth Daily., Disp: , Rfl:   •  Probiotic Product (PROBIOTIC DAILY PO), Take 1 tablet by mouth daily., Disp: , Rfl:   •  rOPINIRole (REQUIP) 0.25 MG tablet, Take 0.25 mg by mouth Every Night. Take 1 hour before bedtime., Disp: , Rfl:   •  sodium chloride (OCEAN) 0.65 % nasal spray, 1 spray into each nostril as needed for congestion., Disp: , Rfl:         Review of Systems:  Pertinent positives are listed as per history of present of  illness, all other systems reviewed and are negative.      Physical Exam:  Vital Signs: These were reviewed and listed as per patient’s electronic medical chart  Vitals:    05/23/19 0909   BP: 140/68   Pulse: 76   Resp: 20   Temp: 98.3 °F (36.8 °C)   SpO2: 99%     General: Awake, alert and oriented, in no distress, elderly  HEENT: Head is atraumatic, normocephalic, extraocular movements full, oropharynx clear, no scleral icterus, pink moist mucous membranes, hard of hearing   Neck: supple, no jvd, lymphadenopathy or masses  Cardiovascular: regular rate and rhythm without murmurs, rubs or gallops  Pulmonary: non-labored, clear to auscultation bilaterally, no wheezing  Abdomen: soft, non-tender, non-distended, normal active bowel sounds present  Extremities: No clubbing, cyanosis or edema  Neurologic: Mental status as above, alert, awake and oriented, grossly non-focal exam with exception of difficulty in hearing  Skin: warm, dry, intact, chronic skin lesions on upper extremities  MSK: mobilizes with a cane   LN: no cervical or supraclavicular LNs  Patient examined on 05/23/19 and changes to examination reflected and noted above      Labs / Studies:  LABS:                      PATHOLOGY:  09/02/16: Peripheral Smear: Normocytic anemia with mild anisopoikilocytosis. Normal white blood cell count and morphology, adequate platelets.  02/09/17: Peripheral Smear:          12/19/16: Flow cytometry:         03/29/17: Flow Cytometry:        06/21/18: Flow Cytometry:            Assessment/Plan :  Kat Kenney is a very pleasant 87 y.o.  female who presents in follow up appointment for further management and evaluation of normocytic anemia.      Normocytic Anemia  - This is likely multifactorial and secondary to iron deficiency versus anemia of chronic inflammation from her arthritis versus medications (MTX) versus blood loss from previous hematuria which are all likely contributing.   - A complete blood count continues  to show ongoing anemia however this appears to be slowly improving since resolution of hematuria. She did receive 2 units of packed red blood cells at Dr. Márquez office on 10/2016. Her serum iron and iron saturation were low with a ferritin of 44 during that visit and she likely has some component of iron deficiency along with anemia of chronic inflammation.   - Today her iron panel continues to be within normal limits without supplementation (she is only on a MVI containing iron as she has previously taken oral iron and was unable to tolerate the medication).   - Her B12 and folate have also been normal.   - She had a negative GREY and did not show signs of hemolysis. ESR was elevated which is likely secondary to her underlying inflammatory arthritis which can also cause anemia of chronic inflammation and hence bone marrow suppression.   - Given thus far negative workup I did also send for an SPEP which showed no evidence of Mspike and there was no abnormal immunofixation. Her serum free light chains were both elevated with a normal ratio and can be elevated in the setting of underlying inflammation.   - It is possible that her inflammatory arthritis is causing inflammation which is suppressing her bone marrow and thus contributing to her anemia. I did also obtain an rheumatoid factor and SUDEEP which were both negative.   - I did also obtain a flow cytometry which was significant for increased atypical NK cells and was unsure of its significance increase in eosinophils and could be secondary to a reactive underlying process such as her inflammatory arthritis vs allergies vs neoplastic. I have also discussed with her the possibility of underlying bone marrow process that would require bone marrow biopsy to further evaluate. However at present she would like to continue to monitor and does not want to pursue bone marrrow.    - On repeat flow cytometry NK cells were no longer noted however she continued to have an  elevated eosinophil level - I suspecedt this was likely secondary to her severe underlying allergies versus bladder cancer. She was treated empirically for an underlying parasitic infection with Ivermectin. Her eosinophils later normalized and again were slightly elevated, and again we discussed bone marrow biopsy however at present she does not want further workup, acute hepatitis panel and SUDEEP were negative.  - Given the above abnormalities I have again discussed the possibility of an underlying bone marrow disorder that could be contributing to her anemia, and I have recommended a bone marrow biopsy to further evaluate. However, she does not want to pursue a bone marrow biopsy.   - Given her complaints of worsening anemia, I did repeat workup which was essentially negative, however her worsening anemia have now may been due to her hematuria and underlying bladder malignancy which has improved today.    Bladder Malignancy  - Patient reports this was completely excised without muscular involvement and adjuvant therapy with chemotherapy or radiation was not recommended.  - She follows with Dr. Kenney every 3 months with repeat imaging. Will obtain records - when we requested her records after her appointment she had not been seen since 2018 will re-address at her next visit.    Iron deficiency  Zinc deficiency  - She was previously on iron supplementation however was unable to tolerate  - Currently she is tolerating an oral multivitamin once daily and iron levels have been normal. Copper normal, however zinc was low. She is on an MVI will repeat copper and zinc today  On a MVI with iron, folic acid     ACO Quality measures  - Kat Kenney does not use tobacco products.  - Kat Kenney received 2018 flu vaccine.  - Current outpatient and discharge medications have been reconciled for the patient.  Reviewed by: Rosmery Powell MD    I will have the patient return in follow up appointment in 4 months with NP. She  understands that should she have any questions or concerns prior to her appointment she should give us a call at any time and I would be happy to see her sooner. It was a pleasure to see this patient in clinic today, thank you for allowing me to participate in the care of this patient.      Rosmery Powell MD  05/23/19  9:38 AM

## 2019-05-25 LAB
COPPER SERPL-MCNC: 118 UG/DL (ref 72–166)
ZINC SERPL-MCNC: 61 UG/DL (ref 56–134)

## 2019-10-03 ENCOUNTER — OFFICE VISIT (OUTPATIENT)
Dept: ONCOLOGY | Facility: CLINIC | Age: 84
End: 2019-10-03

## 2019-10-03 ENCOUNTER — TRANSCRIBE ORDERS (OUTPATIENT)
Dept: ADMINISTRATIVE | Facility: HOSPITAL | Age: 84
End: 2019-10-03

## 2019-10-03 ENCOUNTER — HOSPITAL ENCOUNTER (OUTPATIENT)
Dept: GENERAL RADIOLOGY | Facility: HOSPITAL | Age: 84
Discharge: HOME | End: 2019-10-03
Admitting: INTERNAL MEDICINE

## 2019-10-03 VITALS
HEART RATE: 72 BPM | DIASTOLIC BLOOD PRESSURE: 61 MMHG | RESPIRATION RATE: 20 BRPM | WEIGHT: 156.4 LBS | SYSTOLIC BLOOD PRESSURE: 140 MMHG | TEMPERATURE: 97 F | OXYGEN SATURATION: 97 %

## 2019-10-03 DIAGNOSIS — D64.9 NORMOCYTIC ANEMIA: Primary | ICD-10-CM

## 2019-10-03 DIAGNOSIS — E60 ZINC DEFICIENCY: ICD-10-CM

## 2019-10-03 DIAGNOSIS — R05.9 COUGH: Primary | ICD-10-CM

## 2019-10-03 DIAGNOSIS — D50.9 IRON DEFICIENCY ANEMIA, UNSPECIFIED IRON DEFICIENCY ANEMIA TYPE: ICD-10-CM

## 2019-10-03 PROCEDURE — 99214 OFFICE O/P EST MOD 30 MIN: CPT | Performed by: INTERNAL MEDICINE

## 2019-10-03 PROCEDURE — 71046 X-RAY EXAM CHEST 2 VIEWS: CPT

## 2019-10-03 PROCEDURE — 71046 X-RAY EXAM CHEST 2 VIEWS: CPT | Performed by: RADIOLOGY

## 2019-10-03 NOTE — PROGRESS NOTES
Kat Kenney  [id number]  1/3/1932  10/3/2019      Referring Provider:   Dr. Evelyn Douglas    Reason for Followup:   Normocytic Anemia    Chief Complaint:  Normocytic Anemia      History of Present Illness:  Kat Kenney is a very pleasant 87 y.o.  female who presents in follow up appointment for further management and evaluation of normocytic anemia.     She was found to have anemia while residing in the nursing home for rehabilitation after knee replacement at the end of 2016. At the time of her initial consultation she had left [facility] and was then living at home and given her persistent anemia she was referred to our clinic for further evaluation. She was previously placed on oral iron for a history of iron deficiency however was unable to tolerate the medication due to intolerance and is now currently taking a multivitamin containing iron. She had previously followed with Dr. Morris for possible underlying malignancy but reports that workup was negative (including a bone marrow biopsy) and she had not followed with him in for two years prior to her initial consultation with me with the exception in that she followed with him October 2016 where she received 2 units of packed red blood cells for her anemia. She has not required further blood transfusions since that time. She did also have a bone marrow in 2006 which did reveal a mildly hypercellular marrow for her age with trilineage hematopoiesis. She also has been following with Dr. Kenney for hematuria and frequent urinary tract infections and underwent a cystoscopy as well as TURBT and pathology was significant for bladder cancer with no detrusor invasion. Chemotherapy or radiation was not recommended and she follows with Dr. Kenney every 3 months and recently underwent imaging that was negative.      Interim History:  She states that her last appointment with Dr. Kenney was this past summer. She has no further episodes of hematuria or any abnormal  bleeding. She has been having some weight loss that was felt to be due to taking too much synthroid and she notes that her medication was recently adjusted by her new primary physician. She continues to remain on an MVI for iron and zinc deficiency.        The following portions of the patient's history were reviewed and updated as appropriate: allergies, current medications, past family history, past medical history, past social history, past surgical history and problem list.    Allergies   Allergen Reactions   • Benadryl [Diphenhydramine Hcl (Sleep)]    • Levaquin [Levofloxacin]    • Penicillins    • Zocor [Simvastatin] Unknown (See Comments)     cardioligist took her off of this and said she was allergic       Past Medical History:   Diagnosis Date   • Anemia    • Asthma    • Disease of thyroid gland    • GERD (gastroesophageal reflux disease)    • Hypertension    Coronary artery disease s/p stent placement   Sick sinus syndrome  Hypothryoidism  Hyperlipidemia  Inflammatory arthritis  Bladder Cancer completely excised       Past Surgical History:   Procedure Laterality Date   • BLADDER TUMOR EXCISION  07/30/2018   • BREAST BIOPSY Left    • HYSTERECTOMY     • REPLACEMENT TOTAL KNEE     • SINUS SURGERY     Pacemaker    Social History     Socioeconomic History   • Marital status:      Spouse name: Not on file   • Number of children: Not on file   • Years of education: Not on file   • Highest education level: Not on file   Tobacco Use   • Smoking status: Never Smoker   • Smokeless tobacco: Never Used   Substance and Sexual Activity   • Alcohol use: No   • Drug use: No   She lives by herself.    Family History   Problem Relation Age of Onset   • Breast cancer Sister    • Cancer Brother          Current Outpatient Medications:   •  Albuterol Sulfate (PROAIR HFA IN), Inhale 2 puffs 2 (two) times a day., Disp: , Rfl:   •  aspirin 81 MG EC tablet, Take 81 mg by mouth daily., Disp: , Rfl:   •  atenolol (TENORMIN)  25 MG tablet, Take 25 mg by mouth Daily., Disp: , Rfl:   •  atorvastatin (LIPITOR) 20 MG tablet, Take 20 mg by mouth daily., Disp: , Rfl:   •  conjugated estrogens (PREMARIN) 0.625 MG/GM vaginal cream, Insert 0.5 g into the vagina daily., Disp: , Rfl:   •  Fluticasone Furoate-Vilanterol (BREO ELLIPTA IN), Inhale., Disp: , Rfl:   •  folic acid (FOLVITE) 1 MG tablet, Take 1 mg by mouth daily., Disp: , Rfl:   •  hydrochlorothiazide (MICROZIDE) 12.5 MG capsule, Take 12.5 mg by mouth daily., Disp: , Rfl:   •  levothyroxine (SYNTHROID, LEVOTHROID) 100 MCG tablet, Take 100 mcg by mouth daily., Disp: , Rfl:   •  losartan (COZAAR) 100 MG tablet, Take 100 mg by mouth Daily., Disp: , Rfl:   •  methotrexate 2.5 MG tablet, Take 2.5 mg by mouth 1 (one) time per week., Disp: , Rfl:   •  O2 (OXYGEN), Inhale 2 L/min every night., Disp: , Rfl:   •  omeprazole (PriLOSEC) 40 MG capsule, Take 40 mg by mouth daily., Disp: , Rfl:   •  oxybutynin XL (DITROPAN-XL) 5 MG 24 hr tablet, Take 5 mg by mouth 2 (Two) Times a Day., Disp: , Rfl:   •  Ped Multivitamins-Fl-Iron (MULTIVITAMIN WITH FLUORIDE/IRON) 0.25-10 MG/ML solution solution, Take  by mouth Daily., Disp: , Rfl:   •  Probiotic Product (PROBIOTIC DAILY PO), Take 1 tablet by mouth daily., Disp: , Rfl:   •  rOPINIRole (REQUIP) 0.25 MG tablet, Take 0.25 mg by mouth Every Night. Take 1 hour before bedtime., Disp: , Rfl:   •  sodium chloride (OCEAN) 0.65 % nasal spray, 1 spray into each nostril as needed for congestion., Disp: , Rfl:         Review of Systems:  Pertinent positives are listed as per history of present of illness, all other systems reviewed and are negative.      Physical Exam:  Vital Signs: These were reviewed and listed as per patient’s electronic medical chart  Vitals:    10/03/19 0943   BP: 140/61   Pulse: 72   Resp: 20   Temp: 97 °F (36.1 °C)   SpO2: 97%     General: Awake, alert and oriented, in no distress, elderly  HEENT: Head is atraumatic, normocephalic, extraocular  movements full, oropharynx clear, no scleral icterus, pink moist mucous membranes  Neck: supple, no jvd, lymphadenopathy or masses  Cardiovascular: regular rate and rhythm without murmurs, rubs or gallops  Pulmonary: non-labored, clear to auscultation bilaterally, no wheezing  Abdomen: soft, non-tender, non-distended, normal active bowel sounds present  Extremities: No clubbing, cyanosis or edema  Neurologic: Mental status as above, alert, awake and oriented, grossly non-focal exam   Skin: warm, dry, intact, chronic skin lesions on upper extremities  MSK: mobilizes with a cane   LN: no cervical or supraclavicular LNs  Patient examined on 10/03/19 and changes to examination reflected and noted above      Labs / Studies:  LABS:                      PATHOLOGY:  09/02/16: Peripheral Smear: Normocytic anemia with mild anisopoikilocytosis. Normal white blood cell count and morphology, adequate platelets.  02/09/17: Peripheral Smear:          12/19/16: Flow cytometry:         03/29/17: Flow Cytometry:        06/21/18: Flow Cytometry:            Assessment/Plan :  Kat Kenney is a very pleasant 87 y.o.  female who presents in follow up appointment for further management and evaluation of normocytic anemia.      Normocytic Anemia  - This is likely multifactorial and secondary to iron deficiency versus anemia of chronic inflammation from her arthritis versus medications (MTX) versus blood loss from previous hematuria which are all likely contributing.   - A complete blood count continues to show ongoing anemia however this appears to be slowly improving since resolution of hematuria. She did receive 2 units of packed red blood cells at Dr. Márquez office on 10/2016. Her serum iron and iron saturation were low with a ferritin of 44 during that visit and she likely has some component of iron deficiency along with anemia of chronic inflammation.   - Today her iron panel continues to be within normal limits without  supplementation (she is only on a MVI containing iron as she has previously taken oral iron and was unable to tolerate the medication).   - Her B12 and folate have also been normal.   - She had a negative GREY and did not show signs of hemolysis. ESR was elevated which is likely secondary to her underlying inflammatory arthritis which can also cause anemia of chronic inflammation and hence bone marrow suppression.   - Given thus far negative workup I did also send for an SPEP which showed no evidence of Mspike and there was no abnormal immunofixation. Her serum free light chains were both elevated with a normal ratio and can be elevated in the setting of underlying inflammation.   - It is possible that her inflammatory arthritis is causing inflammation which is suppressing her bone marrow and thus contributing to her anemia. I did also obtain an rheumatoid factor and SUDEEP which were both negative.   - Acute hepatitis panel non reactive.  - I did also obtain a flow cytometry which was significant for increased atypical NK cells and was unsure of its significance increase in eosinophils and could be secondary to a reactive underlying process such as her inflammatory arthritis vs allergies vs neoplastic. I have also discussed with her the possibility of underlying bone marrow process that would require bone marrow biopsy to further evaluate. However at present she would like to continue to monitor and does not want to pursue bone marrrow.    - On repeat flow cytometry NK cells were no longer noted however she continued to have an elevated eosinophil level - I suspected this was likely secondary to her severe underlying allergies versus bladder cancer. She was treated empirically for an underlying parasitic infection with Ivermectin. Her eosinophils later normalized and again were slightly elevated, and we have repeatedly discussed bone marrow biopsy however she declines.  - She did not want to have labs done today as she  recently had a CBC with her primary physician and upon review WBC 6.4, Hb 11, Hct 31.6, Plt 280, MCV 93. Given that her hemoglobin was improving she didn't want to have further lab work.   - Thus will reassess iron panel, zinc, and CBC during next follow up visit.     Bladder Malignancy  - Patient reports this was completely excised without muscular involvement and adjuvant therapy with chemotherapy or radiation was not recommended.  - She follows with Dr. Kenney every 3 months in [place], [place] [facility] Last records we have available to us are from June 2018 however she is adamant that she followed with their clinic this summer - will obtain records.    Iron deficiency  Zinc deficiency  - She was previously on iron supplementation however was unable to tolerate  - Copper normal, however zinc was low. She is not on a separate zinc tablet and instead takes a MVI containing iron and zinc which has improved her levels. She continues to be on folic acid as she is currently on methotrexate.     ACO Quality measures  - Kat Kenney does not use tobacco products.  - Kat Kenney received 2019 flu vaccine.  - Kat Kenney reports a pain score of 0.  Given her pain assessment as noted, no further management or treatment is required.  - Current outpatient and discharge medications have been reconciled for the patient.  Reviewed by: Rosmery Powell MD      I will have the patient return in follow up appointment in 3 months with NP and in 6 months with myself. She understands that should she have any questions or concerns prior to her appointment she should give us a call at any time and I would be happy to see her sooner. It was a pleasure to see this patient in clinic today, thank you for allowing me to participate in the care of this patient.        Rosmery Powell MD  10/03/19  12:07 PM

## 2019-10-24 ENCOUNTER — HOSPITAL ENCOUNTER (OUTPATIENT)
Dept: MAMMOGRAPHY | Facility: HOSPITAL | Age: 84
Discharge: HOME | End: 2019-10-24
Admitting: INTERNAL MEDICINE

## 2019-10-24 ENCOUNTER — LAB (OUTPATIENT)
Dept: ONCOLOGY | Facility: CLINIC | Age: 84
End: 2019-10-24

## 2019-10-24 ENCOUNTER — APPOINTMENT (OUTPATIENT)
Dept: MAMMOGRAPHY | Facility: HOSPITAL | Age: 84
End: 2019-10-24

## 2019-10-24 VITALS
DIASTOLIC BLOOD PRESSURE: 64 MMHG | SYSTOLIC BLOOD PRESSURE: 151 MMHG | OXYGEN SATURATION: 96 % | HEART RATE: 73 BPM | RESPIRATION RATE: 18 BRPM

## 2019-10-24 DIAGNOSIS — Z12.39 SCREENING BREAST EXAMINATION: ICD-10-CM

## 2019-10-24 LAB
BASOPHILS # BLD AUTO: 0.04 10*3/MM3 (ref 0–0.2)
BASOPHILS NFR BLD AUTO: 0.5 % (ref 0–1.5)
DEPRECATED RDW RBC AUTO: 45.4 FL (ref 37–54)
EOSINOPHIL # BLD AUTO: 0.56 10*3/MM3 (ref 0–0.4)
EOSINOPHIL NFR BLD AUTO: 7.6 % (ref 0.3–6.2)
ERYTHROCYTE [DISTWIDTH] IN BLOOD BY AUTOMATED COUNT: 13.2 % (ref 12.3–15.4)
FERRITIN SERPL-MCNC: 83.11 NG/ML (ref 13–150)
HCT VFR BLD AUTO: 29.3 % (ref 34–46.6)
HGB BLD-MCNC: 10.1 G/DL (ref 12–15.9)
IMM GRANULOCYTES # BLD AUTO: 0.01 10*3/MM3 (ref 0–0.05)
IMM GRANULOCYTES NFR BLD AUTO: 0.1 % (ref 0–0.5)
IRON 24H UR-MRATE: 70 MCG/DL (ref 37–145)
IRON SATN MFR SERPL: 20 % (ref 20–50)
LYMPHOCYTES # BLD AUTO: 2.32 10*3/MM3 (ref 0.7–3.1)
LYMPHOCYTES NFR BLD AUTO: 31.4 % (ref 19.6–45.3)
MCH RBC QN AUTO: 32.8 PG (ref 26.6–33)
MCHC RBC AUTO-ENTMCNC: 34.5 G/DL (ref 31.5–35.7)
MCV RBC AUTO: 95.1 FL (ref 79–97)
MONOCYTES # BLD AUTO: 0.79 10*3/MM3 (ref 0.1–0.9)
MONOCYTES NFR BLD AUTO: 10.7 % (ref 5–12)
NEUTROPHILS # BLD AUTO: 3.66 10*3/MM3 (ref 1.7–7)
NEUTROPHILS NFR BLD AUTO: 49.7 % (ref 42.7–76)
NRBC BLD AUTO-RTO: 0 /100 WBC (ref 0–0.2)
PLATELET # BLD AUTO: 244 10*3/MM3 (ref 140–450)
PMV BLD AUTO: 8.7 FL (ref 6–12)
RBC # BLD AUTO: 3.08 10*6/MM3 (ref 3.77–5.28)
TIBC SERPL-MCNC: 355 MCG/DL (ref 298–536)
TRANSFERRIN SERPL-MCNC: 238 MG/DL (ref 200–360)
WBC NRBC COR # BLD: 7.38 10*3/MM3 (ref 3.4–10.8)

## 2019-10-24 PROCEDURE — 82607 VITAMIN B-12: CPT | Performed by: INTERNAL MEDICINE

## 2019-10-24 PROCEDURE — 77067 SCR MAMMO BI INCL CAD: CPT

## 2019-10-24 PROCEDURE — 82746 ASSAY OF FOLIC ACID SERUM: CPT | Performed by: INTERNAL MEDICINE

## 2019-10-24 PROCEDURE — 85025 COMPLETE CBC W/AUTO DIFF WBC: CPT | Performed by: INTERNAL MEDICINE

## 2019-10-24 PROCEDURE — 83540 ASSAY OF IRON: CPT | Performed by: INTERNAL MEDICINE

## 2019-10-24 PROCEDURE — 77067 SCR MAMMO BI INCL CAD: CPT | Performed by: RADIOLOGY

## 2019-10-24 PROCEDURE — 77063 BREAST TOMOSYNTHESIS BI: CPT | Performed by: RADIOLOGY

## 2019-10-24 PROCEDURE — 84466 ASSAY OF TRANSFERRIN: CPT | Performed by: INTERNAL MEDICINE

## 2019-10-24 PROCEDURE — 77063 BREAST TOMOSYNTHESIS BI: CPT

## 2019-10-24 PROCEDURE — 84630 ASSAY OF ZINC: CPT | Performed by: INTERNAL MEDICINE

## 2019-10-24 PROCEDURE — 82728 ASSAY OF FERRITIN: CPT | Performed by: INTERNAL MEDICINE

## 2019-10-25 LAB
FOLATE SERPL-MCNC: >20 NG/ML (ref 4.78–24.2)
VIT B12 BLD-MCNC: 945 PG/ML (ref 211–946)

## 2019-10-26 LAB — ZINC SERPL-MCNC: 66 UG/DL (ref 56–134)

## 2020-01-08 ENCOUNTER — OFFICE VISIT (OUTPATIENT)
Dept: ONCOLOGY | Facility: CLINIC | Age: 85
End: 2020-01-08

## 2020-01-08 VITALS
RESPIRATION RATE: 18 BRPM | WEIGHT: 151.2 LBS | HEART RATE: 86 BPM | TEMPERATURE: 97.4 F | OXYGEN SATURATION: 94 % | SYSTOLIC BLOOD PRESSURE: 131 MMHG | DIASTOLIC BLOOD PRESSURE: 71 MMHG

## 2020-01-08 DIAGNOSIS — E60 ZINC DEFICIENCY: ICD-10-CM

## 2020-01-08 DIAGNOSIS — D64.9 NORMOCYTIC ANEMIA: ICD-10-CM

## 2020-01-08 DIAGNOSIS — D50.9 IRON DEFICIENCY ANEMIA, UNSPECIFIED IRON DEFICIENCY ANEMIA TYPE: ICD-10-CM

## 2020-01-08 DIAGNOSIS — C67.9 MALIGNANT NEOPLASM OF URINARY BLADDER, UNSPECIFIED SITE (HCC): Primary | ICD-10-CM

## 2020-01-08 LAB
BASOPHILS # BLD AUTO: 0.06 10*3/MM3 (ref 0–0.2)
BASOPHILS NFR BLD AUTO: 1 % (ref 0–1.5)
DEPRECATED RDW RBC AUTO: 47.3 FL (ref 37–54)
EOSINOPHIL # BLD AUTO: 0.83 10*3/MM3 (ref 0–0.4)
EOSINOPHIL NFR BLD AUTO: 13.8 % (ref 0.3–6.2)
ERYTHROCYTE [DISTWIDTH] IN BLOOD BY AUTOMATED COUNT: 13.5 % (ref 12.3–15.4)
FERRITIN SERPL-MCNC: 105.8 NG/ML (ref 13–150)
HCT VFR BLD AUTO: 27.9 % (ref 34–46.6)
HGB BLD-MCNC: 9.6 G/DL (ref 12–15.9)
IMM GRANULOCYTES # BLD AUTO: 0.02 10*3/MM3 (ref 0–0.05)
IMM GRANULOCYTES NFR BLD AUTO: 0.3 % (ref 0–0.5)
IRON 24H UR-MRATE: 98 MCG/DL (ref 37–145)
IRON SATN MFR SERPL: 29 % (ref 20–50)
LYMPHOCYTES # BLD AUTO: 2.2 10*3/MM3 (ref 0.7–3.1)
LYMPHOCYTES NFR BLD AUTO: 36.5 % (ref 19.6–45.3)
MCH RBC QN AUTO: 33.1 PG (ref 26.6–33)
MCHC RBC AUTO-ENTMCNC: 34.4 G/DL (ref 31.5–35.7)
MCV RBC AUTO: 96.2 FL (ref 79–97)
MONOCYTES # BLD AUTO: 0.59 10*3/MM3 (ref 0.1–0.9)
MONOCYTES NFR BLD AUTO: 9.8 % (ref 5–12)
NEUTROPHILS # BLD AUTO: 2.32 10*3/MM3 (ref 1.7–7)
NEUTROPHILS NFR BLD AUTO: 38.6 % (ref 42.7–76)
NRBC BLD AUTO-RTO: 0 /100 WBC (ref 0–0.2)
PLATELET # BLD AUTO: 225 10*3/MM3 (ref 140–450)
PMV BLD AUTO: 8.7 FL (ref 6–12)
RBC # BLD AUTO: 2.9 10*6/MM3 (ref 3.77–5.28)
TIBC SERPL-MCNC: 343 MCG/DL (ref 298–536)
TRANSFERRIN SERPL-MCNC: 230 MG/DL (ref 200–360)
WBC NRBC COR # BLD: 6.02 10*3/MM3 (ref 3.4–10.8)

## 2020-01-08 PROCEDURE — 83540 ASSAY OF IRON: CPT | Performed by: INTERNAL MEDICINE

## 2020-01-08 PROCEDURE — 84630 ASSAY OF ZINC: CPT | Performed by: INTERNAL MEDICINE

## 2020-01-08 PROCEDURE — 84466 ASSAY OF TRANSFERRIN: CPT | Performed by: INTERNAL MEDICINE

## 2020-01-08 PROCEDURE — 82728 ASSAY OF FERRITIN: CPT | Performed by: INTERNAL MEDICINE

## 2020-01-08 PROCEDURE — 85025 COMPLETE CBC W/AUTO DIFF WBC: CPT | Performed by: INTERNAL MEDICINE

## 2020-01-08 PROCEDURE — 36415 COLL VENOUS BLD VENIPUNCTURE: CPT | Performed by: NURSE PRACTITIONER

## 2020-01-08 PROCEDURE — 99214 OFFICE O/P EST MOD 30 MIN: CPT | Performed by: NURSE PRACTITIONER

## 2020-01-08 NOTE — PROGRESS NOTES
Kat Kenney  [id number]  1/3/1932  1/8/2020     Referring Provider:   Dr. Evelyn Douglas    Reason for Followup:   Normocytic Anemia    Chief Complaint:  Follow up of  Anemia    History of Present Illness:  Kat Kenney is a very pleasant 87 y.o.  female who presents in follow up appointment for further management and evaluation of normocytic anemia.     She was found to have anemia while residing in the nursing home for rehabilitation after knee replacement at the end of 2016. At the time of her initial consultation she had left [facility] and was then living at home and given her persistent anemia she was referred to our clinic for further evaluation. She was previously placed on oral iron for a history of iron deficiency however was unable to tolerate the medication due to intolerance and is now currently taking a multivitamin containing iron. She had previously followed with Dr. Morris for possible underlying malignancy but reports that workup was negative (including a bone marrow biopsy) and she had not followed with him in for two years prior to her initial consultation with me with the exception in that she followed with him October 2016 where she received 2 units of packed red blood cells for her anemia. She has not required further blood transfusions since that time. She did also have a bone marrow in 2006 which did reveal a mildly hypercellular marrow for her age with trilineage hematopoiesis. She also has been following with Dr. Kenney for hematuria and frequent urinary tract infections and underwent a cystoscopy as well as TURBT and pathology was significant for bladder cancer with no detrusor invasion. Chemotherapy or radiation was not recommended and she follows with Dr. Kenney every 3 months and recently underwent imaging that was negative.      Interim History:  Ms. Kenney presents today for follow up of anemia. Since her last visit, she reports she has been struggling with cough, hoarseness  and reflux. She is taking omeprazole daily. She followed up with GI and reports having EGD/colonoscopy last month. She says there was no active bleeding but does have a hiatal hernia. She will follow up with Dr. Cunningham later today. She denies melena, rectal bleeding or hematuria. She continues to take MVI daily. She continues to have chronic aches/pains and remains on methotrexate. She denies any other complaints today.        Allergies   Allergen Reactions   • Benadryl [Diphenhydramine Hcl (Sleep)]    • Levaquin [Levofloxacin]    • Penicillins    • Zocor [Simvastatin] Unknown (See Comments)     cardioligist took her off of this and said she was allergic       Past Medical History:   Diagnosis Date   • Anemia    • Asthma    • Disease of thyroid gland    • GERD (gastroesophageal reflux disease)    • Hypertension    Coronary artery disease s/p stent placement   Sick sinus syndrome  Hypothryoidism  Hyperlipidemia  Inflammatory arthritis  Bladder Cancer completely excised       Past Surgical History:   Procedure Laterality Date   • BLADDER TUMOR EXCISION  07/30/2018   • BREAST BIOPSY Left    • HYSTERECTOMY     • REPLACEMENT TOTAL KNEE     • SINUS SURGERY     Pacemaker    Social History     Socioeconomic History   • Marital status:      Spouse name: Not on file   • Number of children: Not on file   • Years of education: Not on file   • Highest education level: Not on file   Tobacco Use   • Smoking status: Never Smoker   • Smokeless tobacco: Never Used   Substance and Sexual Activity   • Alcohol use: No   • Drug use: No   She lives by herself.    Family History   Problem Relation Age of Onset   • Breast cancer Sister    • Cancer Brother          Current Outpatient Medications:   •  Albuterol Sulfate (PROAIR HFA IN), Inhale 2 puffs 2 (two) times a day., Disp: , Rfl:   •  aspirin 81 MG EC tablet, Take 81 mg by mouth daily., Disp: , Rfl:   •  atenolol (TENORMIN) 25 MG tablet, Take 25 mg by mouth Daily., Disp: ,  Rfl:   •  atorvastatin (LIPITOR) 20 MG tablet, Take 20 mg by mouth daily., Disp: , Rfl:   •  conjugated estrogens (PREMARIN) 0.625 MG/GM vaginal cream, Insert 0.5 g into the vagina daily., Disp: , Rfl:   •  Fluticasone Furoate-Vilanterol (BREO ELLIPTA IN), Inhale., Disp: , Rfl:   •  folic acid (FOLVITE) 1 MG tablet, Take 1 mg by mouth daily., Disp: , Rfl:   •  hydrochlorothiazide (MICROZIDE) 12.5 MG capsule, Take 12.5 mg by mouth daily., Disp: , Rfl:   •  levothyroxine (SYNTHROID, LEVOTHROID) 100 MCG tablet, Take 100 mcg by mouth daily., Disp: , Rfl:   •  losartan (COZAAR) 100 MG tablet, Take 100 mg by mouth Daily., Disp: , Rfl:   •  methotrexate 2.5 MG tablet, Take 2.5 mg by mouth 1 (one) time per week., Disp: , Rfl:   •  O2 (OXYGEN), Inhale 2 L/min every night., Disp: , Rfl:   •  omeprazole (PriLOSEC) 40 MG capsule, Take 40 mg by mouth daily., Disp: , Rfl:   •  oxybutynin XL (DITROPAN-XL) 5 MG 24 hr tablet, Take 5 mg by mouth 2 (Two) Times a Day., Disp: , Rfl:   •  Ped Multivitamins-Fl-Iron (MULTIVITAMIN WITH FLUORIDE/IRON) 0.25-10 MG/ML solution solution, Take  by mouth Daily., Disp: , Rfl:   •  Probiotic Product (PROBIOTIC DAILY PO), Take 1 tablet by mouth daily., Disp: , Rfl:   •  rOPINIRole (REQUIP) 0.25 MG tablet, Take 0.25 mg by mouth Every Night. Take 1 hour before bedtime., Disp: , Rfl:   •  sodium chloride (OCEAN) 0.65 % nasal spray, 1 spray into each nostril as needed for congestion., Disp: , Rfl:     Review of Systems:  Pertinent positives are listed as per history of present of illness, all other systems reviewed and are negative.    Physical Exam:  Vital Signs: These were reviewed and listed as per patient’s electronic medical chart  Vitals:    01/08/20 1303   BP: 131/71   Pulse: 86   Resp: 18   Temp: 97.4 °F (36.3 °C)   SpO2: 94%     General: Awake, alert and oriented, in no distress  HEENT: Head is atraumatic, normocephalic, extraocular movements full, oropharynx clear, no scleral icterus, pink  moist mucous membranes  Neck: supple, no jvd, lymphadenopathy or masses  Cardiovascular: regular rate and rhythm without murmurs, rubs or gallops  Pulmonary: non-labored, clear to auscultation bilaterally, no wheezing  Abdomen: soft, non-tender, non-distended, normal active bowel sounds present  Extremities: No clubbing, cyanosis or edema  Neurologic: Mental status as above, alert, awake and oriented, grossly non-focal exam   Skin: warm, dry, intact, chronic skin lesions on upper extremities  MSK: mobilizes with a cane   LN: no cervical or supraclavicular LNs    Labs / Studies:  WBC   Date Value Ref Range Status   01/08/2020 6.02 3.40 - 10.80 10*3/mm3 Final     RBC   Date Value Ref Range Status   01/08/2020 2.90 (L) 3.77 - 5.28 10*6/mm3 Final     Hemoglobin   Date Value Ref Range Status   01/08/2020 9.6 (L) 12.0 - 15.9 g/dL Final     Hematocrit   Date Value Ref Range Status   01/08/2020 27.9 (L) 34.0 - 46.6 % Final     MCV   Date Value Ref Range Status   01/08/2020 96.2 79.0 - 97.0 fL Final     MCH   Date Value Ref Range Status   01/08/2020 33.1 (H) 26.6 - 33.0 pg Final     MCHC   Date Value Ref Range Status   01/08/2020 34.4 31.5 - 35.7 g/dL Final     RDW   Date Value Ref Range Status   01/08/2020 13.5 12.3 - 15.4 % Final     RDW-SD   Date Value Ref Range Status   01/08/2020 47.3 37.0 - 54.0 fl Final     MPV   Date Value Ref Range Status   01/08/2020 8.7 6.0 - 12.0 fL Final     Platelets   Date Value Ref Range Status   01/08/2020 225 140 - 450 10*3/mm3 Final     Neutrophil %   Date Value Ref Range Status   01/08/2020 38.6 (L) 42.7 - 76.0 % Final     Lymphocyte %   Date Value Ref Range Status   01/08/2020 36.5 19.6 - 45.3 % Final     Monocyte %   Date Value Ref Range Status   01/08/2020 9.8 5.0 - 12.0 % Final     Eosinophil %   Date Value Ref Range Status   01/08/2020 13.8 (H) 0.3 - 6.2 % Final     Basophil %   Date Value Ref Range Status   01/08/2020 1.0 0.0 - 1.5 % Final     Immature Grans %   Date Value Ref Range  Status   01/08/2020 0.3 0.0 - 0.5 % Final     Neutrophils, Absolute   Date Value Ref Range Status   01/08/2020 2.32 1.70 - 7.00 10*3/mm3 Final     Lymphocytes, Absolute   Date Value Ref Range Status   01/08/2020 2.20 0.70 - 3.10 10*3/mm3 Final     Monocytes, Absolute   Date Value Ref Range Status   01/08/2020 0.59 0.10 - 0.90 10*3/mm3 Final     Eosinophils, Absolute   Date Value Ref Range Status   01/08/2020 0.83 (H) 0.00 - 0.40 10*3/mm3 Final     Basophils, Absolute   Date Value Ref Range Status   01/08/2020 0.06 0.00 - 0.20 10*3/mm3 Final     Immature Grans, Absolute   Date Value Ref Range Status   01/08/2020 0.02 0.00 - 0.05 10*3/mm3 Final     nRBC   Date Value Ref Range Status   01/08/2020 0.0 0.0 - 0.2 /100 WBC Final     Lab Results   Component Value Date    GLUCOSE 108 08/22/2018    BUN 20 08/22/2018    CREATININE 0.81 08/22/2018    EGFRIFNONA 67 08/22/2018    EGFRIFAFRI  09/15/2016      Comment:      <15 Indicative of kidney failure.    BCR 24.7 08/22/2018    K 4.9 08/22/2018    CO2 28.8 08/22/2018    CALCIUM 8.5 08/22/2018    PROTENTOTREF 6.0 02/27/2018    ALBUMIN 3.80 06/19/2018    LABIL2 1.2 02/27/2018    AST 23 06/19/2018    ALT 14 06/19/2018     Lab Results   Component Value Date    FERRITIN 105.80 01/08/2020    IRON 98 01/08/2020    TIBC 343 01/08/2020    LABIRON 29 01/08/2020    DENHKXMY74 945 10/24/2019    FOLATE >20.00 10/24/2019    HAPTOGLOBIN 190 06/19/2018    RETICCTPCT 1.84 06/19/2018    RETIC 0.0513 06/19/2018     PATHOLOGY:  09/02/16: Peripheral Smear: Normocytic anemia with mild anisopoikilocytosis. Normal white blood cell count and morphology, adequate platelets.  02/09/17: Peripheral Smear:          12/19/16: Flow cytometry:         03/29/17: Flow Cytometry:        06/21/18: Flow Cytometry:      Assessment/Plan :  Kat Kenney is a very pleasant 87 y.o.  female with     Normocytic Anemia  - This is likely multifactorial and secondary to iron deficiency versus anemia of chronic  inflammation from her arthritis versus medications (MTX) versus blood loss from previous hematuria (now resolved) which are all likely contributing.   - A complete blood count continued to show ongoing anemia however this slowly improved since resolution of hematuria. She did receive 2 units of packed red blood cells at Dr. Márquez office on 10/2016. Her serum iron and iron saturation were low with a ferritin of 44 during that visit and she likely has some component of iron deficiency along with anemia of chronic inflammation.   - Today her iron panel continues to be within normal limits without supplementation (she is only on a MVI containing iron as she has previously taken oral iron and was unable to tolerate the medication).   - Her B12 and folate have also been normal.   - She had a negative GREY and did not show signs of hemolysis. ESR was elevated which is likely secondary to her underlying inflammatory arthritis which can also cause anemia of chronic inflammation and hence bone marrow suppression.   - Given thus far negative workup, sent for an SPEP which showed no evidence of Mspike and there was no abnormal immunofixation. Her serum free light chains were both elevated with a normal ratio and can be elevated in the setting of underlying inflammation.   - It is possible that her inflammatory arthritis is causing inflammation which is suppressing her bone marrow and thus contributing to her anemia. Also obtained a rheumatoid factor and SUDEEP which were both negative.   - Acute hepatitis panel non reactive.  - Obtained a flow cytometry which was significant for increased atypical NK cells and was unsure of its significance increase in eosinophils and could be secondary to a reactive underlying process such as her inflammatory arthritis vs allergies vs neoplastic. We have discussed with her the possibility of underlying bone marrow process that would require bone marrow biopsy to further evaluate. However, given  stable counts at present she would like to continue to monitor and does not want to pursue bone marrrow.    - On repeat flow cytometry NK cells were no longer noted however she continued to have an elevated eosinophil level - suspected this was likely secondary to her severe underlying allergies versus bladder cancer. She was treated empirically for an underlying parasitic infection with Ivermectin. Her eosinophils later normalized and are again elevated, and we have repeatedly discussed bone marrow biopsy however she would like to hold off on this as above.   -CBC from today showing stable counts.   -Will continue to monitor and she will follow up with Dr. Powell as scheduled in 3 months.     Bladder Malignancy  - Patient reports this was completely excised without muscular involvement and adjuvant therapy with chemotherapy or radiation was not recommended.  - She follows with Dr. Kenney every 3 months in [place], [place] with [facility] with repeat imaging.     Iron deficiency  Zinc deficiency  - She was previously on iron supplementation and had difficulty tolerating it.   -Denies blood loss from any source. Reports recent EGD/colonoscopy with Dr. Cunningham which was reportedly negative for bleeding. Will request reports.   - Copper normal, however zinc was low. She is not on zinc supplementation but taking a MVI containing iron and zinc daily which has improved her levels. Iron studies from today show iron remains replete. Zinc was normal when last checked in October. Repeat Zinc level from today pending. She continues to be on folic acid as she is also on methotrexate.     ACO / JAXSON/Other  Quality measures  -  Kat Kenney received 2019 flu vaccine.  -  Kat Kenney reports a pain score of 0.    -  Current outpatient and discharge medications have been reconciled for the patient.  Reviewed by: Luann Bhakta, APRN    I spent 25 minutes with Kat Kenney today.  In the office today,  more than 50% of this time was spent face-to-face with her  in counseling / coordination of care, reviewing her interim medical history and counseling on the current treatment plan.  All questions were answered to her satisfaction.       Luann Bhakta, APRN  01/08/20  1:03 PM

## 2020-01-10 ENCOUNTER — DOCUMENTATION (OUTPATIENT)
Dept: ONCOLOGY | Facility: CLINIC | Age: 85
End: 2020-01-10

## 2020-01-10 LAB — ZINC SERPL-MCNC: 49 UG/DL (ref 56–134)

## 2020-01-10 NOTE — PROGRESS NOTES
Patient low on zinc and is currently only on a MVI will ask her to take one zinc tablet daily. I have been unable to reach her and will continue to attempt.

## 2020-06-10 ENCOUNTER — OFFICE VISIT (OUTPATIENT)
Dept: ONCOLOGY | Facility: CLINIC | Age: 85
End: 2020-06-10

## 2020-06-10 VITALS
OXYGEN SATURATION: 95 % | RESPIRATION RATE: 18 BRPM | DIASTOLIC BLOOD PRESSURE: 64 MMHG | WEIGHT: 138.3 LBS | TEMPERATURE: 97.5 F | HEART RATE: 78 BPM | SYSTOLIC BLOOD PRESSURE: 143 MMHG

## 2020-06-10 DIAGNOSIS — C67.9 MALIGNANT NEOPLASM OF URINARY BLADDER, UNSPECIFIED SITE (HCC): ICD-10-CM

## 2020-06-10 DIAGNOSIS — D64.9 NORMOCYTIC ANEMIA: Primary | ICD-10-CM

## 2020-06-10 DIAGNOSIS — D50.9 IRON DEFICIENCY ANEMIA, UNSPECIFIED IRON DEFICIENCY ANEMIA TYPE: ICD-10-CM

## 2020-06-10 DIAGNOSIS — R05.3 COUGH, PERSISTENT: ICD-10-CM

## 2020-06-10 DIAGNOSIS — R63.4 WEIGHT LOSS, ABNORMAL: ICD-10-CM

## 2020-06-10 LAB
ALBUMIN SERPL-MCNC: 3.64 G/DL (ref 3.5–5.2)
ALBUMIN/GLOB SERPL: 1.2 G/DL
ALP SERPL-CCNC: 72 U/L (ref 39–117)
ALT SERPL W P-5'-P-CCNC: 14 U/L (ref 1–33)
ANION GAP SERPL CALCULATED.3IONS-SCNC: 9 MMOL/L (ref 5–15)
AST SERPL-CCNC: 17 U/L (ref 1–32)
BASOPHILS # BLD AUTO: 0.07 10*3/MM3 (ref 0–0.2)
BASOPHILS NFR BLD AUTO: 1.3 % (ref 0–1.5)
BILIRUB SERPL-MCNC: 0.3 MG/DL (ref 0.2–1.2)
BUN BLD-MCNC: 18 MG/DL (ref 8–23)
BUN/CREAT SERPL: 23.7 (ref 7–25)
CALCIUM SPEC-SCNC: 9 MG/DL (ref 8.6–10.5)
CHLORIDE SERPL-SCNC: 98 MMOL/L (ref 98–107)
CO2 SERPL-SCNC: 24 MMOL/L (ref 22–29)
CREAT BLD-MCNC: 0.76 MG/DL (ref 0.57–1)
CRP SERPL-MCNC: 0.03 MG/DL (ref 0–0.5)
DEPRECATED RDW RBC AUTO: 47.4 FL (ref 37–54)
EOSINOPHIL # BLD AUTO: 1.1 10*3/MM3 (ref 0–0.4)
EOSINOPHIL NFR BLD AUTO: 20.7 % (ref 0.3–6.2)
ERYTHROCYTE [DISTWIDTH] IN BLOOD BY AUTOMATED COUNT: 13.5 % (ref 12.3–15.4)
FERRITIN SERPL-MCNC: 137.9 NG/ML (ref 13–150)
FOLATE SERPL-MCNC: >20 NG/ML (ref 4.78–24.2)
GFR SERPL CREATININE-BSD FRML MDRD: 72 ML/MIN/1.73
GLOBULIN UR ELPH-MCNC: 3.1 GM/DL
GLUCOSE BLD-MCNC: 97 MG/DL (ref 65–99)
HAPTOGLOB SERPL-MCNC: 169 MG/DL (ref 30–200)
HCT VFR BLD AUTO: 29.8 % (ref 34–46.6)
HGB BLD-MCNC: 9.8 G/DL (ref 12–15.9)
IMM GRANULOCYTES # BLD AUTO: 0.01 10*3/MM3 (ref 0–0.05)
IMM GRANULOCYTES NFR BLD AUTO: 0.2 % (ref 0–0.5)
IRON 24H UR-MRATE: 78 MCG/DL (ref 37–145)
IRON SATN MFR SERPL: 22 % (ref 20–50)
LDH SERPL-CCNC: 189 U/L (ref 135–214)
LYMPHOCYTES # BLD AUTO: 2.05 10*3/MM3 (ref 0.7–3.1)
LYMPHOCYTES NFR BLD AUTO: 38.6 % (ref 19.6–45.3)
MCH RBC QN AUTO: 31.8 PG (ref 26.6–33)
MCHC RBC AUTO-ENTMCNC: 32.9 G/DL (ref 31.5–35.7)
MCV RBC AUTO: 96.8 FL (ref 79–97)
MONOCYTES # BLD AUTO: 0.46 10*3/MM3 (ref 0.1–0.9)
MONOCYTES NFR BLD AUTO: 8.7 % (ref 5–12)
NEUTROPHILS # BLD AUTO: 1.62 10*3/MM3 (ref 1.7–7)
NEUTROPHILS NFR BLD AUTO: 30.5 % (ref 42.7–76)
NRBC BLD AUTO-RTO: 0 /100 WBC (ref 0–0.2)
PLATELET # BLD AUTO: 198 10*3/MM3 (ref 140–450)
PMV BLD AUTO: 9 FL (ref 6–12)
POTASSIUM BLD-SCNC: 4.3 MMOL/L (ref 3.5–5.2)
PROT SERPL-MCNC: 6.7 G/DL (ref 6–8.5)
RBC # BLD AUTO: 3.08 10*6/MM3 (ref 3.77–5.28)
RETICS # AUTO: 0.04 10*6/MM3 (ref 0.02–0.13)
RETICS/RBC NFR AUTO: 1.45 % (ref 0.7–1.9)
SODIUM BLD-SCNC: 131 MMOL/L (ref 136–145)
TIBC SERPL-MCNC: 352 MCG/DL (ref 298–536)
TRANSFERRIN SERPL-MCNC: 236 MG/DL (ref 200–360)
TSH SERPL DL<=0.05 MIU/L-ACNC: 0.3 UIU/ML (ref 0.27–4.2)
VIT B12 BLD-MCNC: 817 PG/ML (ref 211–946)
WBC NRBC COR # BLD: 5.31 10*3/MM3 (ref 3.4–10.8)

## 2020-06-10 PROCEDURE — 82746 ASSAY OF FOLIC ACID SERUM: CPT | Performed by: INTERNAL MEDICINE

## 2020-06-10 PROCEDURE — 83615 LACTATE (LD) (LDH) ENZYME: CPT | Performed by: INTERNAL MEDICINE

## 2020-06-10 PROCEDURE — 85045 AUTOMATED RETICULOCYTE COUNT: CPT | Performed by: INTERNAL MEDICINE

## 2020-06-10 PROCEDURE — 82728 ASSAY OF FERRITIN: CPT | Performed by: INTERNAL MEDICINE

## 2020-06-10 PROCEDURE — 83010 ASSAY OF HAPTOGLOBIN QUANT: CPT | Performed by: INTERNAL MEDICINE

## 2020-06-10 PROCEDURE — 83540 ASSAY OF IRON: CPT | Performed by: INTERNAL MEDICINE

## 2020-06-10 PROCEDURE — 84443 ASSAY THYROID STIM HORMONE: CPT | Performed by: INTERNAL MEDICINE

## 2020-06-10 PROCEDURE — 36415 COLL VENOUS BLD VENIPUNCTURE: CPT | Performed by: INTERNAL MEDICINE

## 2020-06-10 PROCEDURE — 86140 C-REACTIVE PROTEIN: CPT | Performed by: INTERNAL MEDICINE

## 2020-06-10 PROCEDURE — 82607 VITAMIN B-12: CPT | Performed by: INTERNAL MEDICINE

## 2020-06-10 PROCEDURE — 84466 ASSAY OF TRANSFERRIN: CPT | Performed by: INTERNAL MEDICINE

## 2020-06-10 PROCEDURE — 99214 OFFICE O/P EST MOD 30 MIN: CPT | Performed by: INTERNAL MEDICINE

## 2020-06-10 PROCEDURE — 84630 ASSAY OF ZINC: CPT | Performed by: INTERNAL MEDICINE

## 2020-06-10 PROCEDURE — 85025 COMPLETE CBC W/AUTO DIFF WBC: CPT | Performed by: INTERNAL MEDICINE

## 2020-06-10 PROCEDURE — 80053 COMPREHEN METABOLIC PANEL: CPT | Performed by: INTERNAL MEDICINE

## 2020-06-10 NOTE — PROGRESS NOTES
Kat Kenney  [id number]  1/3/1932  6/10/2020      Referring Provider:   Dr. Evelyn Douglas    Reason for Followup:   Normocytic Anemia    Chief Complaint:  Normocytic Anemia      History of Present Illness:  Kat Kenney is a very pleasant 88 y.o.  female who presents in follow up appointment for further management and evaluation of normocytic anemia.     She was found to have anemia while residing in the nursing home for rehabilitation after knee replacement at the end of 2016. At the time of her initial consultation she had left [facility] and was then living at home and given her persistent anemia she was referred to our clinic for further evaluation. She was previously placed on oral iron for a history of iron deficiency however was unable to tolerate the medication due to intolerance and is now currently taking a multivitamin containing iron. She had previously followed with Dr. Morris for possible underlying malignancy but reports that workup was negative (including a bone marrow biopsy) and she had not followed with him in for two years prior to her initial consultation with me with the exception in that she followed with him October 2016 where she received 2 units of packed red blood cells for her anemia. She has not required further blood transfusions since that time. She did also have a bone marrow in 2006 which did reveal a mildly hypercellular marrow for her age with trilineage hematopoiesis. She also has been following with Dr. Kenney for hematuria and frequent urinary tract infections and underwent a cystoscopy as well as TURBT and pathology was significant for bladder cancer with no detrusor invasion. Chemotherapy or radiation was not recommended and she follows with Dr. Kenney every 3 months and recently underwent imaging that was negative.      Interim History:  She states that she was evaluated by Dr. Kenney last summer however we contacted their office to obtain records and she had not been  seen since 2018. She has been having unintentional weight loss in the last one year and more so since earlier this year. She states that at first it was felt to be due to decreased oral intake due to ill fitting dentures and later felt to be due to her thyroid medication. However she has continued to lose weight despite adjustments. She reports that she has had imaging of her chest at [facility] with the last in Feb 2020 for chronic cough without any underlying cause seen. Her primary provider is also making a referral to ENT. She has not yet been evaluated by Dr. Kenney since her appointment was rescheduled due to COVID and believes she has an upcoming appointment. She is currently on a MVI as well as oral Zinc. She denies of any abnormal bleeding. She has also followed with Dr. Cunningham in gastroenterology and had an EGD/colonoscopy without active bleeding and significant for a hiatal hernia. She also continues to follow with Dr. Luz at the [facility] for polyarthritis for which she is currently taking methotrexate and folic acid. She otherwise denies of any fevers, night sweats, lymphadenopathy.      The following portions of the patient's history were reviewed and updated as appropriate: allergies, current medications, past family history, past medical history, past social history, past surgical history and problem list.    Allergies   Allergen Reactions   • Benadryl [Diphenhydramine Hcl (Sleep)]    • Levaquin [Levofloxacin]    • Penicillins    • Zocor [Simvastatin] Unknown (See Comments)     cardioligist took her off of this and said she was allergic       Past Medical History:   Diagnosis Date   • Anemia    • Asthma    • Disease of thyroid gland    • GERD (gastroesophageal reflux disease)    • Hypertension    Coronary artery disease s/p stent placement   Sick sinus syndrome  Hypothryoidism  Hyperlipidemia  Inflammatory arthritis  Bladder Cancer completely excised       Past Surgical  History:   Procedure Laterality Date   • BLADDER TUMOR EXCISION  07/30/2018   • BREAST BIOPSY Left    • HYSTERECTOMY     • REPLACEMENT TOTAL KNEE     • SINUS SURGERY     Pacemaker    Social History     Socioeconomic History   • Marital status:      Spouse name: Not on file   • Number of children: Not on file   • Years of education: Not on file   • Highest education level: Not on file   Tobacco Use   • Smoking status: Never Smoker   • Smokeless tobacco: Never Used   Substance and Sexual Activity   • Alcohol use: No   • Drug use: No   She lives by herself.    Family History   Problem Relation Age of Onset   • Breast cancer Sister    • Cancer Brother          Current Outpatient Medications:   •  Albuterol Sulfate (PROAIR HFA IN), Inhale 2 puffs 2 (two) times a day., Disp: , Rfl:   •  aspirin 81 MG EC tablet, Take 81 mg by mouth daily., Disp: , Rfl:   •  atenolol (TENORMIN) 25 MG tablet, Take 25 mg by mouth Daily., Disp: , Rfl:   •  atorvastatin (LIPITOR) 20 MG tablet, Take 20 mg by mouth daily., Disp: , Rfl:   •  conjugated estrogens (PREMARIN) 0.625 MG/GM vaginal cream, Insert 0.5 g into the vagina daily., Disp: , Rfl:   •  Fluticasone Furoate-Vilanterol (BREO ELLIPTA IN), Inhale., Disp: , Rfl:   •  folic acid (FOLVITE) 1 MG tablet, Take 1 mg by mouth daily., Disp: , Rfl:   •  hydrochlorothiazide (MICROZIDE) 12.5 MG capsule, Take 12.5 mg by mouth daily., Disp: , Rfl:   •  levothyroxine (SYNTHROID, LEVOTHROID) 100 MCG tablet, Take 100 mcg by mouth daily., Disp: , Rfl:   •  losartan (COZAAR) 100 MG tablet, Take 100 mg by mouth Daily., Disp: , Rfl:   •  methotrexate 2.5 MG tablet, Take 2.5 mg by mouth 1 (one) time per week., Disp: , Rfl:   •  O2 (OXYGEN), Inhale 2 L/min every night., Disp: , Rfl:   •  omeprazole (PriLOSEC) 40 MG capsule, Take 40 mg by mouth daily., Disp: , Rfl:   •  oxybutynin XL (DITROPAN-XL) 5 MG 24 hr tablet, Take 5 mg by mouth 2 (Two) Times a Day., Disp: , Rfl:   •  Ped Multivitamins-Fl-Iron  (MULTIVITAMIN WITH FLUORIDE/IRON) 0.25-10 MG/ML solution solution, Take  by mouth Daily., Disp: , Rfl:   •  Probiotic Product (PROBIOTIC DAILY PO), Take 1 tablet by mouth daily., Disp: , Rfl:   •  rOPINIRole (REQUIP) 0.25 MG tablet, Take 0.25 mg by mouth Every Night. Take 1 hour before bedtime., Disp: , Rfl:   •  sodium chloride (OCEAN) 0.65 % nasal spray, 1 spray into each nostril as needed for congestion., Disp: , Rfl:         Review of Systems:  Pertinent positives are listed as per history of present of illness, all other systems reviewed and are negative. Persistent cough.      Physical Exam:  Vital Signs: These were reviewed and listed as per patient’s electronic medical chart  Vitals:    06/10/20 1114   BP: 143/64   Pulse: 78   Resp: 18   Temp: 97.5 °F (36.4 °C)   SpO2: 95%     General: Awake, alert and oriented, in no distress, elderly  HEENT: Head is atraumatic, normocephalic, extraocular movements full, mask in place, no scleral icterus  Neck: supple, no jvd, lymphadenopathy or masses  Cardiovascular: regular rate and rhythm without murmurs, rubs or gallops  Pulmonary: non-labored, clear to auscultation bilaterally, no wheezing  Abdomen: soft, non-tender, non-distended, normal active bowel sounds present  Extremities: No clubbing, cyanosis or edema  Neurologic: Mental status as above, alert, awake and oriented, grossly non-focal exam , hard of hearing  Skin: warm, dry, intact, chronic skin lesions on upper extremities  MSK: mobilizes with a cane   LN: no cervical or supraclavicular LNs  Patient examined on 06/10/20 and changes to examination reflected and noted above      Labs / Studies:  LABS:          IMAGING:                                     PATHOLOGY:  09/02/16: Peripheral Smear: Normocytic anemia with mild anisopoikilocytosis. Normal white blood cell count and morphology, adequate platelets.  02/09/17: Peripheral Smear:          12/19/16: Flow cytometry:         03/29/17: Flow  Cytometry:        06/21/18: Flow Cytometry:            Assessment/Plan :  Kat Kenney is a very pleasant 88 y.o.  female who presents in follow up appointment for further management and evaluation of normocytic anemia.      Normocytic Anemia  - This is likely multifactorial and secondary to iron deficiency versus anemia of chronic inflammation from her arthritis versus medications (MTX) versus blood loss from previous hematuria (now resolved) which are all likely contributing.   - A complete blood count continued to show ongoing anemia however this slowly improved since resolution of hematuria. She did receive 2 units of packed red blood cells at Dr. Márquez office on 10/2016. Her serum iron and iron saturation were low with a ferritin of 44 during that visit and she likely has some component of iron deficiency along with anemia of chronic inflammation.   - Today her iron panel continues to be within normal limits without supplementation (she is only on a MVI containing iron, she previously took oral iron and unable to tolerate).   - B12 and folate have also been normal.   - She had a negative GREY and did not show signs of hemolysis. ESR was elevated which is likely secondary to her underlying inflammatory arthritis which can also cause anemia of chronic inflammation and hence bone marrow suppression. Acute hepatitis panel non reactive.  - Given thus far negative workup, SPEP showed no evidence of Mspike and there was no abnormal immunofixation. Her serum free light chains were both elevated with a normal ratio and can be elevated in the setting of underlying inflammation.   - Possible that her inflammatory arthritis is causing inflammation which is suppressing her bone marrow and thus contributing to her anemia. Also obtained a rheumatoid factor and SUDEEP which were both negative. She continues to follow with Rheumatology.  - Obtained a flow cytometry which was significant for increased atypical NK cells and  was unsure of its significance increase in eosinophils and could be secondary to a reactive underlying process such as her inflammatory arthritis vs allergies vs neoplastic. We have discussed with her the possibility of underlying bone marrow process that would require bone marrow biopsy to further evaluate. However, given stable counts at present she would like to continue to monitor and does not want to pursue bone marrrow.    - On repeat flow cytometry NK cells were no longer noted however she continued to have an elevated eosinophil level - suspected this was likely secondary to her severe underlying allergies versus bladder cancer. She was treated empirically for an underlying parasitic infection with Ivermectin. Her eosinophils later normalized and are again elevated, and we have repeatedly discussed bone marrow biopsy and at present she is not interested despite weight loss.   -CBC from today showing stable counts.      Weight Loss  -This was initially attributed to decreased oral intake due to ill fitting dentures and later to thyroid issues. However she has ongoing weight loss despite adjustment. She reports a good appetite but continues to lose weight. Due to eosinophilia, weight loss, chronic cough, and history of bladder malignancy will obtain a CT chest, abdomen, and pelvis. She also has an appointment with ENT in regards to her chronic cough. CT chest from February by her primary provider did not reveal any acute abnormalities.    Bladder Malignancy  - Patient reports this was completely excised without muscular involvement and adjuvant therapy with chemotherapy or radiation was not recommended.  - She follows with Dr. Kenney every 3 months in [place] [place] [facility]. Last records we have available to us are from June 2018 however she is adamant that she followed with their clinic this but we were unable to obtain any records as she had not been seen. She now  reports that that she has an appointment this month - will confirm given weight loss. If she does not have an appointment will set her up with one.     Iron deficiency  Zinc deficiency  - She was previously on iron supplementation however was unable to tolerate  - Copper normal, however zinc was low. She is currently on iron containing MVI containing as well as a separate zinc tablet which has improved her levels. She continues to be on folic acid as she is currently on methotrexate. Iron studies normal.    ACO Quality measures  - Kat Kenney does not use tobacco products.  - Kat Kenney received 2019 flu vaccine.  -  Kat Kenney reports a pain score of 0. Given her pain assessment as noted, no further treatment or management is required and she will continue to follow with her primary provider and will again reassess at next visit.  -  Current outpatient and discharge medications have been reconciled for the patient.  Reviewed by: Rosmery Powell MD      I will have the patient return for follow up appointment in three months with NP and in six months with myself, will however reschedule these appointment based on imaging findings. She understands that should she have any questions or concerns prior to her appointment she should give us a call at any time and I would be happy to see her sooner. It was a pleasure to see this patient in clinic today, thank you for allowing me to participate in the care of this patient.      Rosmery Powell MD  06/10/20  14:01

## 2020-06-16 LAB — ZINC SERPL-MCNC: 75 UG/DL (ref 56–134)

## 2020-06-17 ENCOUNTER — HOSPITAL ENCOUNTER (OUTPATIENT)
Dept: CT IMAGING | Facility: HOSPITAL | Age: 85
Discharge: HOME | End: 2020-06-17

## 2020-06-17 ENCOUNTER — HOSPITAL ENCOUNTER (OUTPATIENT)
Dept: CT IMAGING | Facility: HOSPITAL | Age: 85
Discharge: HOME | End: 2020-06-17
Admitting: INTERNAL MEDICINE

## 2020-06-17 DIAGNOSIS — D64.9 NORMOCYTIC ANEMIA: ICD-10-CM

## 2020-06-17 DIAGNOSIS — C67.9 MALIGNANT NEOPLASM OF URINARY BLADDER, UNSPECIFIED SITE (HCC): ICD-10-CM

## 2020-06-17 DIAGNOSIS — R63.4 WEIGHT LOSS, ABNORMAL: ICD-10-CM

## 2020-06-17 DIAGNOSIS — R05.3 COUGH, PERSISTENT: ICD-10-CM

## 2020-06-17 PROCEDURE — 71260 CT THORAX DX C+: CPT

## 2020-06-17 PROCEDURE — 71260 CT THORAX DX C+: CPT | Performed by: RADIOLOGY

## 2020-06-17 PROCEDURE — 74177 CT ABD & PELVIS W/CONTRAST: CPT

## 2020-06-17 PROCEDURE — 74177 CT ABD & PELVIS W/CONTRAST: CPT | Performed by: RADIOLOGY

## 2020-06-17 PROCEDURE — 0 IOVERSOL 68 % SOLUTION: Performed by: INTERNAL MEDICINE

## 2020-06-17 RX ADMIN — IOVERSOL 70 ML: 678 INJECTION INTRA-ARTERIAL; INTRAVENOUS at 14:12

## 2020-07-01 ENCOUNTER — TELEPHONE (OUTPATIENT)
Dept: ONCOLOGY | Facility: HOSPITAL | Age: 85
End: 2020-07-01

## 2020-07-07 ENCOUNTER — OFFICE VISIT (OUTPATIENT)
Dept: ONCOLOGY | Facility: CLINIC | Age: 85
End: 2020-07-07

## 2020-07-07 DIAGNOSIS — R63.4 WEIGHT LOSS, ABNORMAL: ICD-10-CM

## 2020-07-07 DIAGNOSIS — D64.9 NORMOCYTIC ANEMIA: Primary | ICD-10-CM

## 2020-07-07 PROCEDURE — 99442 PR PHYS/QHP TELEPHONE EVALUATION 11-20 MIN: CPT | Performed by: INTERNAL MEDICINE

## 2020-07-07 NOTE — PROGRESS NOTES
Kat Kenney  [id number]  1/3/1932  7/7/2020      Referring Provider:   Dr. Evelyn Douglas    Reason for Followup:   Normocytic Anemia    Chief Complaint:  Normocytic Anemia      History of Present Illness:  Kat Kenney is a very pleasant 88 y.o.  female who presents in follow up appointment for further management and evaluation of normocytic anemia.     She was found to have anemia while residing in the nursing home for rehabilitation after knee replacement at the end of 2016. At the time of her initial consultation she had left [facility] and was then living at home and given her persistent anemia she was referred to our clinic for further evaluation. She was previously placed on oral iron for a history of iron deficiency however was unable to tolerate the medication due to intolerance and is now currently taking a multivitamin containing iron. She had previously followed with Dr. Morris for possible underlying malignancy but reports that workup was negative (including a bone marrow biopsy) and she had not followed with him in for two years prior to her initial consultation with me with the exception in that she followed with him October 2016 where she received 2 units of packed red blood cells for her anemia. She has not required further blood transfusions since that time. She did also have a bone marrow in 2006 which did reveal a mildly hypercellular marrow for her age with trilineage hematopoiesis. She also has been following with Dr. Kenney for hematuria and frequent urinary tract infections and underwent a cystoscopy as well as TURBT and pathology was significant for bladder cancer with no detrusor invasion. Chemotherapy or radiation was not recommended and she follows with Dr. Kenney every 3 months and recently underwent imaging that was negative. She states that she was evaluated by Dr. Kenney last summer however we contacted their office to obtain records and she had not been seen since 2018.      Interim History:  She has been having unintentional weight loss in the last one year and more so since earlier this year. She states that at first it was felt to be due to decreased oral intake due to ill fitting dentures and later felt to be due to her thyroid medication. However she has continued to lose weight despite adjustments. She reports that she has had imaging of her chest at [facility] with the last in Feb 2020 for chronic cough without any underlying cause seen. Her primary provider is also making a referral to ENT. She has an appointment with Dr. Kenney next week which was re-scheduled due to COVID. She is currently on a MVI as well as oral Zinc. She has also followed with Dr. Cunningham in gastroenterology and had an EGD/colonoscopy without active bleeding and significant for a hiatal hernia. She also continues to follow with Dr. Luz at the [facility] for polyarthritis for which she is currently taking methotrexate and folic acid. She otherwise denies of any fevers, night sweats, lymphadenopathy.    Since her last visit she denies of any further weight loss and in fact notes weight gain.         The following portions of the patient's history were reviewed and updated as appropriate: allergies, current medications, past family history, past medical history, past social history, past surgical history and problem list.    Allergies   Allergen Reactions   • Benadryl [Diphenhydramine Hcl (Sleep)]    • Levaquin [Levofloxacin]    • Penicillins    • Zocor [Simvastatin] Unknown (See Comments)     cardioligist took her off of this and said she was allergic       Past Medical History:   Diagnosis Date   • Anemia    • Asthma    • Disease of thyroid gland    • GERD (gastroesophageal reflux disease)    • Hypertension    Coronary artery disease s/p stent placement   Sick sinus syndrome  Hypothryoidism  Hyperlipidemia  Inflammatory arthritis  Bladder Cancer completely excised         Past  Surgical History:   Procedure Laterality Date   • BLADDER TUMOR EXCISION  07/30/2018   • BREAST BIOPSY Left    • HYSTERECTOMY     • REPLACEMENT TOTAL KNEE     • SINUS SURGERY     Pacemaker        Social History     Socioeconomic History   • Marital status:      Spouse name: Not on file   • Number of children: Not on file   • Years of education: Not on file   • Highest education level: Not on file   Tobacco Use   • Smoking status: Never Smoker   • Smokeless tobacco: Never Used   Substance and Sexual Activity   • Alcohol use: No   • Drug use: No   She lives by herself.    Family History   Problem Relation Age of Onset   • Breast cancer Sister    • Cancer Brother          Current Outpatient Medications:   •  Albuterol Sulfate (PROAIR HFA IN), Inhale 2 puffs 2 (two) times a day., Disp: , Rfl:   •  aspirin 81 MG EC tablet, Take 81 mg by mouth daily., Disp: , Rfl:   •  atenolol (TENORMIN) 25 MG tablet, Take 25 mg by mouth Daily., Disp: , Rfl:   •  atorvastatin (LIPITOR) 20 MG tablet, Take 20 mg by mouth daily., Disp: , Rfl:   •  conjugated estrogens (PREMARIN) 0.625 MG/GM vaginal cream, Insert 0.5 g into the vagina daily., Disp: , Rfl:   •  Fluticasone Furoate-Vilanterol (BREO ELLIPTA IN), Inhale., Disp: , Rfl:   •  folic acid (FOLVITE) 1 MG tablet, Take 1 mg by mouth daily., Disp: , Rfl:   •  hydrochlorothiazide (MICROZIDE) 12.5 MG capsule, Take 12.5 mg by mouth daily., Disp: , Rfl:   •  levothyroxine (SYNTHROID, LEVOTHROID) 100 MCG tablet, Take 100 mcg by mouth daily., Disp: , Rfl:   •  losartan (COZAAR) 100 MG tablet, Take 100 mg by mouth Daily., Disp: , Rfl:   •  methotrexate 2.5 MG tablet, Take 2.5 mg by mouth 1 (one) time per week., Disp: , Rfl:   •  O2 (OXYGEN), Inhale 2 L/min every night., Disp: , Rfl:   •  omeprazole (PriLOSEC) 40 MG capsule, Take 40 mg by mouth daily., Disp: , Rfl:   •  oxybutynin XL (DITROPAN-XL) 5 MG 24 hr tablet, Take 5 mg by mouth 2 (Two) Times a Day., Disp: , Rfl:   •  Ped  Multivitamins-Fl-Iron (MULTIVITAMIN WITH FLUORIDE/IRON) 0.25-10 MG/ML solution solution, Take  by mouth Daily., Disp: , Rfl:   •  Probiotic Product (PROBIOTIC DAILY PO), Take 1 tablet by mouth daily., Disp: , Rfl:   •  rOPINIRole (REQUIP) 0.25 MG tablet, Take 0.25 mg by mouth Every Night. Take 1 hour before bedtime., Disp: , Rfl:   •  sodium chloride (OCEAN) 0.65 % nasal spray, 1 spray into each nostril as needed for congestion., Disp: , Rfl:         Review of Systems:  Pertinent positives are listed as per history of present of illness, all other systems reviewed and are negative. Weight gain.       Physical Exam:  Exam:  Vital Signs: Unable to obtain as patient was not in office  There were no vitals filed for this visit.  General: Awake, alert and oriented, does not appear to sound distressed over the phone  HEENT: Normal hearing on the telephone  Pulmonary: no auditory wheezing  Neurologic: Mental status as above, alert, awake and oriented, memory appears to be intact  Psych: normal mood and affect          Labs / Studies:  LABS:          IMAGIN/17/20:  CT CHEST WITH CONTRAST: Contrast was injected intravenously and spiral scans were obtained from the apices of the lung and extended to the diaphragm. Both lung and soft tissue windows were archived. On the lung windows, the lungs are generally hyperinflated. There are granulomas seen scattered throughout both lungs. There are calcified lymph nodes in the mediastinum and hilar areas. There is some minimal atelectasis in the left lung base. No pulmonary parenchymal lung nodules to suggest metastatic disease were demonstrated. On the mediastinal windows, there were no enlarged lymph nodes that were not calcified in the mediastinum or hilar areas. There is a large hiatal hernia of the stomach measuring approximately 9 cm in diameter. IMPRESSION: No CT findings to suggest metastatic disease in the chest. There are multiple  granulomas in the lungs and calcifications in the mediastinal lymph nodes. There is a large hiatal hernia of the stomach.     CT ABDOMEN PELVIS W CONTRAST: Images obtained through the lower chest show a large hiatal hernia of the stomach in the lower mediastinum. The hernia has a diameter of approximately 9 cm. There are multiple granulomas noted in the spleen. The spleen was not enlarged. The liver showed no evidence of focal abnormality. There is no thickening of the gallbladder wall or dilatation of the bile ducts in the liver. The pancreas showed no evidence of mass or inflammation. The kidneys showed no evidence of obstruction. No focal renal parenchymal abnormalities were demonstrated. The aorta is normal in caliber. No enlarged lymph nodes were noted. The bowel shows no evidence of obstruction. There is a moderate amount of gas and fecal material throughout the colon suggesting constipation. There is no ascites in the abdomen. In the pelvis, the urinary bladder is moderately distended with urine. There is no thickening of the bladder wall. No masses or fluid collections were seen in the pelvis. IMPRESSION: No CT findings of metastatic disease. Urinary bladder is moderately distended with urine but showed no focal bladder wall abnormalities. There is a large amount of gas and stool in the colon suggesting constipation. There is a very large hiatal hernia of the stomach in the lower mediastinum.          PATHOLOGY:  09/02/16: Peripheral Smear: Normocytic anemia with mild anisopoikilocytosis. Normal white blood cell count and morphology, adequate platelets.  02/09/17: Peripheral Smear:          12/19/16: Flow cytometry:         03/29/17: Flow Cytometry:        06/21/18: Flow Cytometry:            Assessment/Plan :  Kat Kenney is a very pleasant 88 y.o.  female who presents in follow up appointment for further management and evaluation of normocytic anemia.      Normocytic Anemia  - This is likely  multifactorial and secondary to iron deficiency versus anemia of chronic inflammation from her arthritis versus medications (MTX) versus blood loss from previous hematuria (now resolved) which are all likely contributing.   - A complete blood count continued to show ongoing anemia however this slowly improved since resolution of hematuria. She did receive 2 units of packed red blood cells at Dr. Márquez office on 10/2016. Her serum iron and iron saturation were low with a ferritin of 44 during that visit and she likely has some component of iron deficiency along with anemia of chronic inflammation.   - Today her iron panel continues to be within normal limits without supplementation (she is only on a MVI containing iron, she previously took oral iron and unable to tolerate).   - B12 and folate have also been normal.   - She had a negative GREY and did not show signs of hemolysis. ESR was elevated which is likely secondary to her underlying inflammatory arthritis which can also cause anemia of chronic inflammation and hence bone marrow suppression. Acute hepatitis panel non reactive.  - Given thus far negative workup, SPEP showed no evidence of Mspike and there was no abnormal immunofixation. Her serum free light chains were both elevated with a normal ratio and can be elevated in the setting of underlying inflammation.   - Possible that her inflammatory arthritis is causing inflammation which is suppressing her bone marrow and thus contributing to her anemia. Also obtained a rheumatoid factor and SUDEEP which were both negative. She continues to follow with Rheumatology.  - Obtained a flow cytometry which was significant for increased atypical NK cells and was unsure of its significance increase in eosinophils and could be secondary to a reactive underlying process such as her inflammatory arthritis vs allergies vs neoplastic. We have discussed with her the possibility of underlying bone marrow process that would require  bone marrow biopsy to further evaluate. However, given stable counts at present she would like to continue to monitor and does not want to pursue bone marrrow.    - On repeat flow cytometry NK cells were no longer noted however she continued to have an elevated eosinophil level - suspected this was likely secondary to her severe underlying allergies versus bladder cancer. She was treated empirically for an underlying parasitic infection with Ivermectin. Her eosinophils later normalized and are again elevated, and we have repeatedly discussed bone marrow biopsy and at present she is not interested despite weight loss.   - CBC from today showing stable counts.      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 However she has ongoing weight loss despite adjustment. She also has an appointment with ENT in regards to her chronic cough. CT chest from February by her primary provider did not reveal any acute abnormalities.  - She reports a good appetite but continued to lose weight. Due to eosinophilia, weight loss, chronic cough, and history of bladder malignancy I did obtain a CT chest, abdomen, and pelvis which did not show any evidence of metastatic disease and has an appointment with Dr. Kenney next week. Multiple granulomas seen and she reports that this is chronic and known to her and was diagnosed after infection in childhood.     Bladder Malignancy  - Patient reports this was completely excised without muscular involvement and adjuvant therapy with chemotherapy or radiation was not recommended.  - She follows with Dr. Kenney every 3 months in [place] [place] [facility]. Last records we have available to us are from June 2018 however she is adamant that she followed with their clinic this but we were unable to obtain any records as she had not been  seen. She reports that she has an appointment next week on July 15th.     Iron deficiency  Zinc deficiency  - She was previously on iron supplementation however was unable to tolerate  - Copper normal, however zinc was low. She is currently on iron containing MVI containing as well as a separate zinc tablet which has improved her levels. She continues to be on folic acid as she is currently on methotrexate. Iron studies normal.    ACO Quality measures  - Kat Kenney does not use tobacco products.  - Kat Kenney received 2019 flu vaccine.  -  Kat Kenney reports a pain score of . Given her pain assessment as noted, no further treatment or management is required and she will continue to follow with her primary provider and will again reassess at next visit.  -  Current outpatient and discharge medications have been reconciled for the patient.  Reviewed by: Rosmery Powell MD      I will have the patient return for follow up appointment as previously scheduled in three months with NP and in six months with myself, will however reschedule these appointment based on imaging findings. She understands that should she have any questions or concerns prior to her appointment she should give us a call at any time and I would be happy to see her sooner. It was a pleasure to talk to this patient in clinic today, thank you for allowing me to participate in the care of this patient.    Patient was unable to have video capability therefore this visit was done over the telephone. Approximately eleven minutes spent on the phone with the patient discussing the above issues.       Rosmery Powell MD  07/07/20  07:58

## 2020-07-16 ENCOUNTER — TRANSCRIBE ORDERS (OUTPATIENT)
Dept: ADMINISTRATIVE | Facility: HOSPITAL | Age: 85
End: 2020-07-16

## 2020-07-16 DIAGNOSIS — Z01.818 OTHER SPECIFIED PRE-OPERATIVE EXAMINATION: Primary | ICD-10-CM

## 2020-07-20 ENCOUNTER — LAB (OUTPATIENT)
Dept: LAB | Facility: HOSPITAL | Age: 85
End: 2020-07-20

## 2020-07-20 DIAGNOSIS — Z01.818 OTHER SPECIFIED PRE-OPERATIVE EXAMINATION: ICD-10-CM

## 2020-07-20 PROCEDURE — U0002 COVID-19 LAB TEST NON-CDC: HCPCS

## 2020-07-20 PROCEDURE — C9803 HOPD COVID-19 SPEC COLLECT: HCPCS

## 2020-07-20 PROCEDURE — U0004 COV-19 TEST NON-CDC HGH THRU: HCPCS

## 2020-07-21 LAB
REF LAB TEST METHOD: NORMAL
SARS-COV-2 RNA RESP QL NAA+PROBE: NOT DETECTED

## 2020-09-15 ENCOUNTER — TELEPHONE (OUTPATIENT)
Dept: ONCOLOGY | Facility: CLINIC | Age: 85
End: 2020-09-15

## 2020-09-15 NOTE — TELEPHONE ENCOUNTER
Caller: Kat Kenney    Relationship to patient: Self    Best call back number: [phone]    Chief complaint: Pt needs to cancel appts    Type of visit: F/U and Lab appts    Requested date: Pt will call back to reschedule visits    If rescheduling, when is the original appointment: 20 @ 8:30 am    Additional notes:  Pt was just informed the batteries in her pacemaker . She has a last minute appt scheduled tomorrow with the cardiologist and will call us back to reschedule once she knows her future plans.    Hub is unable to cancel appt at pt's request.

## 2020-10-27 ENCOUNTER — OFFICE VISIT (OUTPATIENT)
Dept: ONCOLOGY | Facility: CLINIC | Age: 85
End: 2020-10-27

## 2020-10-27 DIAGNOSIS — R63.4 WEIGHT LOSS, ABNORMAL: ICD-10-CM

## 2020-10-27 DIAGNOSIS — D64.9 NORMOCYTIC ANEMIA: Primary | ICD-10-CM

## 2020-10-27 DIAGNOSIS — D50.9 IRON DEFICIENCY ANEMIA, UNSPECIFIED IRON DEFICIENCY ANEMIA TYPE: ICD-10-CM

## 2020-10-27 DIAGNOSIS — E60 ZINC DEFICIENCY: ICD-10-CM

## 2020-10-27 DIAGNOSIS — C67.9 MALIGNANT NEOPLASM OF URINARY BLADDER, UNSPECIFIED SITE (HCC): ICD-10-CM

## 2020-10-27 PROCEDURE — 99442 PR PHYS/QHP TELEPHONE EVALUATION 11-20 MIN: CPT | Performed by: INTERNAL MEDICINE

## 2020-10-27 NOTE — PROGRESS NOTES
Kat Kenney  [id number]  1/3/1932  10/27/2020      Referring Provider:   Dr. Evelyn Douglas    Reason for Followup:   Normocytic Anemia    Chief Complaint:  Normocytic Anemia      History of Present Illness:  Kat Kenney is a very pleasant 88 y.o.  female who presents in follow up appointment for further management and evaluation of normocytic anemia.     She was found to have anemia while residing in the nursing home for rehabilitation after knee replacement at the end of 2016. At the time of her initial consultation she had left [facility] and was then living at home and given her persistent anemia she was referred to our clinic for further evaluation. She was previously placed on oral iron for a history of iron deficiency however was unable to tolerate the medication due to intolerance and is now currently taking a multivitamin containing iron. She had previously followed with Dr. Morris for possible underlying malignancy but reports that workup was negative (including a bone marrow biopsy) and she had not followed with him in for two years prior to her initial consultation with me with the exception in that she followed with him October 2016 where she received 2 units of packed red blood cells for her anemia. She has not required further blood transfusions since that time. She did also have a bone marrow in 2006 which did reveal a mildly hypercellular marrow for her age with trilineage hematopoiesis. She also has been following with Dr. Kenney for hematuria and frequent urinary tract infections and underwent a cystoscopy as well as TURBT and pathology was significant for bladder cancer with no detrusor invasion. Chemotherapy or radiation was not recommended and she follows with Dr. Kenney every 3 months and recently underwent imaging that was negative. She states that she was evaluated by Dr. Kenney last summer however we contacted their office to obtain records and she had not been seen since 2018.      She has been having unintentional weight loss since 2019 and more so since earlier this year (2020). She states that at first it was felt to be due to decreased oral intake due to ill fitting dentures and later felt to be due to her thyroid medication. However she has continued to lose weight despite adjustments. She reports that she has had imaging of her chest at [facility] with the last in Feb 2020 for chronic cough without any underlying cause seen. Her primary provider is also making a referral to ENT. She has also followed with Dr. Cunningham in gastroenterology and had an EGD/colonoscopy without active bleeding and significant for a hiatal hernia. She also continues to follow with Dr. Luz at the [facility] for polyarthritis for which she is currently taking methotrexate and folic acid. She otherwise denies of any fevers, night sweats, lymphadenopathy.    Interim History:  Since her last visit she reports that she recently followed with Dr. Kenney who gave her a good report without recurrence of bladder cancer. She is currently on an iron containing MVI as well as oral Zinc.   Since her last visit she denies of any further weight loss and in fact and reports stable weight in the 140 range and reports that weight loss was felt to be due to thyroid issues.         The following portions of the patient's history were reviewed and updated as appropriate: allergies, current medications, past family history, past medical history, past social history, past surgical history and problem list.    Allergies   Allergen Reactions   • Benadryl [Diphenhydramine Hcl (Sleep)]    • Levaquin [Levofloxacin]    • Penicillins    • Zocor [Simvastatin] Unknown (See Comments)     cardioligist took her off of this and said she was allergic       Past Medical History:   Diagnosis Date   • Anemia    • Asthma    • Disease of thyroid gland    • GERD (gastroesophageal reflux disease)    • Hypertension    Coronary  artery disease s/p stent placement   Sick sinus syndrome  Hypothryoidism  Hyperlipidemia  Inflammatory arthritis  Bladder Cancer completely excised         Past Surgical History:   Procedure Laterality Date   • BLADDER TUMOR EXCISION  07/30/2018   • BREAST BIOPSY Left    • HYSTERECTOMY     • REPLACEMENT TOTAL KNEE     • SINUS SURGERY     Pacemaker        Social History     Socioeconomic History   • Marital status:      Spouse name: Not on file   • Number of children: Not on file   • Years of education: Not on file   • Highest education level: Not on file   Tobacco Use   • Smoking status: Never Smoker   • Smokeless tobacco: Never Used   Substance and Sexual Activity   • Alcohol use: No   • Drug use: No   She lives by herself.    Family History   Problem Relation Age of Onset   • Breast cancer Sister    • Cancer Brother          Current Outpatient Medications:   •  Albuterol Sulfate (PROAIR HFA IN), Inhale 2 puffs 2 (two) times a day., Disp: , Rfl:   •  aspirin 81 MG EC tablet, Take 81 mg by mouth daily., Disp: , Rfl:   •  atenolol (TENORMIN) 25 MG tablet, Take 25 mg by mouth Daily., Disp: , Rfl:   •  atorvastatin (LIPITOR) 20 MG tablet, Take 20 mg by mouth daily., Disp: , Rfl:   •  conjugated estrogens (PREMARIN) 0.625 MG/GM vaginal cream, Insert 0.5 g into the vagina daily., Disp: , Rfl:   •  Fluticasone Furoate-Vilanterol (BREO ELLIPTA IN), Inhale., Disp: , Rfl:   •  folic acid (FOLVITE) 1 MG tablet, Take 1 mg by mouth daily., Disp: , Rfl:   •  hydrochlorothiazide (MICROZIDE) 12.5 MG capsule, Take 12.5 mg by mouth daily., Disp: , Rfl:   •  levothyroxine (SYNTHROID, LEVOTHROID) 100 MCG tablet, Take 100 mcg by mouth daily., Disp: , Rfl:   •  losartan (COZAAR) 100 MG tablet, Take 100 mg by mouth Daily., Disp: , Rfl:   •  methotrexate 2.5 MG tablet, Take 2.5 mg by mouth 1 (one) time per week., Disp: , Rfl:   •  O2 (OXYGEN), Inhale 2 L/min every night., Disp: , Rfl:   •  omeprazole (PriLOSEC) 40 MG capsule, Take  40 mg by mouth daily., Disp: , Rfl:   •  oxybutynin XL (DITROPAN-XL) 5 MG 24 hr tablet, Take 5 mg by mouth 2 (Two) Times a Day., Disp: , Rfl:   •  Ped Multivitamins-Fl-Iron (MULTIVITAMIN WITH FLUORIDE/IRON) 0.25-10 MG/ML solution solution, Take  by mouth Daily., Disp: , Rfl:   •  Probiotic Product (PROBIOTIC DAILY PO), Take 1 tablet by mouth daily., Disp: , Rfl:   •  rOPINIRole (REQUIP) 0.25 MG tablet, Take 0.25 mg by mouth Every Night. Take 1 hour before bedtime., Disp: , Rfl:   •  sodium chloride (OCEAN) 0.65 % nasal spray, 1 spray into each nostril as needed for congestion., Disp: , Rfl:         Review of Systems:  Pertinent positives are listed as per history of present of illness, all other systems reviewed and are negative. Stable weight.        Physical Exam:  Exam:  Vital Signs: Eight as per patient 140lbs. Unable to obtain as patient was not in office  There were no vitals filed for this visit.  General: Awake, alert and oriented, does not appear to sound distressed over the phone  HEENT: Normal hearing on the telephone  Pulmonary: no auditory wheezing  Neurologic: Mental status as above, alert, awake and oriented, memory appears to be intact  Psych: normal mood and affect          Labs / Studies:  LABS:        ENDOSCOPIC REPORTS:  2019:              IMAGIN/17/20:  CT CHEST WITH CONTRAST: Contrast was injected intravenously and spiral scans were obtained from the apices of the lung and extended to the diaphragm. Both lung and soft tissue windows were archived. On the lung windows, the lungs are generally hyperinflated. There are granulomas seen scattered throughout both lungs. There are calcified lymph nodes in the mediastinum and hilar areas. There is some minimal atelectasis in the left lung base. No pulmonary parenchymal lung nodules to suggest metastatic disease were demonstrated. On the mediastinal windows, there were no enlarged lymph nodes that were not  calcified in the mediastinum or hilar areas. There is a large hiatal hernia of the stomach measuring approximately 9 cm in diameter. IMPRESSION: No CT findings to suggest metastatic disease in the chest. There are multiple granulomas in the lungs and calcifications in the mediastinal lymph nodes. There is a large hiatal hernia of the stomach.     CT ABDOMEN PELVIS W CONTRAST: Images obtained through the lower chest show a large hiatal hernia of the stomach in the lower mediastinum. The hernia has a diameter of approximately 9 cm. There are multiple granulomas noted in the spleen. The spleen was not enlarged. The liver showed no evidence of focal abnormality. There is no thickening of the gallbladder wall or dilatation of the bile ducts in the liver. The pancreas showed no evidence of mass or inflammation. The kidneys showed no evidence of obstruction. No focal renal parenchymal abnormalities were demonstrated. The aorta is normal in caliber. No enlarged lymph nodes were noted. The bowel shows no evidence of obstruction. There is a moderate amount of gas and fecal material throughout the colon suggesting constipation. There is no ascites in the abdomen. In the pelvis, the urinary bladder is moderately distended with urine. There is no thickening of the bladder wall. No masses or fluid collections were seen in the pelvis. IMPRESSION: No CT findings of metastatic disease. Urinary bladder is moderately distended with urine but showed no focal bladder wall abnormalities. There is a large amount of gas and stool in the colon suggesting constipation. There is a very large hiatal hernia of the stomach in the lower mediastinum.          PATHOLOGY:  09/02/16: Peripheral Smear: Normocytic anemia with mild anisopoikilocytosis. Normal white blood cell count and morphology, adequate platelets.  02/09/17: Peripheral Smear:          12/19/16: Flow cytometry:         03/29/17: Flow Cytometry:        06/21/18: Flow  Cytometry:            Assessment/Plan :  Kat Kenney is a very pleasant 88 y.o.  female who presents in follow up appointment for further management and evaluation of normocytic anemia.      Normocytic Anemia  - This is likely multifactorial and secondary to iron deficiency versus anemia of chronic inflammation from her arthritis versus medications (MTX) versus blood loss from previous hematuria (now resolved) which are all likely contributing.   - A complete blood count continued to show ongoing anemia however this slowly improved since resolution of hematuria. She did receive 2 units of packed red blood cells at Dr. Márquez office on 10/2016. Her serum iron and iron saturation were low with a ferritin of 44 during that visit and she likely has some component of iron deficiency along with anemia of chronic inflammation.   - Today her iron panel continues to be within normal limits without supplementation (she is only on a MVI containing iron, she previously took oral iron and unable to tolerate).   - B12 and folate have also been normal.   - She had a negative GREY and did not show signs of hemolysis. ESR was elevated which is likely secondary to her underlying inflammatory arthritis which can also cause anemia of chronic inflammation and hence bone marrow suppression. Acute hepatitis panel non reactive.  - Given thus far negative workup, SPEP showed no evidence of Mspike and there was no abnormal immunofixation. Her serum free light chains were both elevated with a normal ratio and can be elevated in the setting of underlying inflammation.   - Possible that her inflammatory arthritis is causing inflammation which is suppressing her bone marrow and thus contributing to her anemia. Also obtained a rheumatoid factor and SUDEEP which were both negative. She continues to follow with Rheumatology.  - Obtained a flow cytometry which was significant for increased atypical NK cells and was unsure of its significance  increase in eosinophils and could be secondary to a reactive underlying process such as her inflammatory arthritis vs allergies vs neoplastic. We have discussed with her the possibility of underlying bone marrow process that would require bone marrow biopsy to further evaluate. However, given stable counts at present she would like to continue to monitor and does not want to pursue bone marrrow.    - On repeat flow cytometry NK cells were no longer noted however she continued to have an elevated eosinophil level - suspected this was likely secondary to her severe underlying allergies versus bladder cancer. She was treated empirically for an underlying parasitic infection with Ivermectin. Her eosinophils later normalized and are again elevated, and we have repeatedly discussed bone marrow biopsy and at present she is not interested despite weight loss which has now stabilized and felt to be due to thyroid medications. Eosinophilia has resolved on most recent lab work - October 2020  - CBC obtained at outside hospital this week shows an ongoing anemia with Hb 8.9. Her iron levels were elevated and therefore she was asked to stop MVI containing iron.     Weight Loss  -This was initially attributed to decreased oral intake due to ill fitting dentures and later to thyroid issues. However she has ongoing weight loss despite adjustment. She also has an appointment with ENT in regards to her chronic cough. CT chest from February by her primary provider did not reveal any acute abnormalities.  - She reports a good appetite but continued to lose weight. Due to eosinophilia, weight loss, chronic cough, and history of bladder malignancy I did obtain a CT chest, abdomen, and pelvis which did not show any evidence of metastatic disease and has an appointment with Dr. Kenney next week. Multiple granulomas seen and she reports that this is chronic and known to her and was diagnosed after infection in childhood.     Bladder  Malignancy  - Patient reports this was completely excised without muscular involvement and adjuvant therapy with chemotherapy or radiation was not recommended.  - She follows with Dr. Kenney every 3 months in [place], [place] with [facility] with repeat imaging. She reports that her most recent appointment last month went well without any evidence of recurrence. Will obtain records.     Iron deficiency  Zinc deficiency  - She was previously on iron supplementation however was unable to tolerate  - Copper normal, however zinc was low. She is currently on iron containing MVI containing as well as a separate zinc tablet which has improved her levels. She continues to be on folic acid as she is currently on methotrexate. Iron studies elevated therefore will have her discontinue oral iron    ACO Quality measures  - Kat Kenney does not use tobacco products.  -  Kat Kenney received 2020 flu vaccine.  -  Kat Kenney does not report of any pain. Given her pain assessment as noted, treatment options were discussed and the following options were decided upon as a follow-up plan to address the patient's pain: continuation of current treatment plan for pain.  -  Current outpatient and discharge medications have been reconciled for the patient.  Reviewed by: Rosmery Powell MD    I will have the patient return for follow up appointment as previously scheduled in three months with NP and in six months with myself. She understands that should she have any questions or concerns prior to her appointment she should give us a call at any time and I would be happy to see her sooner. It was a pleasure to talk to this patient in clinic today, thank you for allowing me to participate in the care of this patient.    Patient was unable to have video capability therefore this visit was done over the telephone. Approximately seventeen minutes spent on the phone with the patient discussing the above issues.       Rosmery  MD Sherry  10/27/20  15:36 EDT

## 2021-01-29 ENCOUNTER — OFFICE VISIT (OUTPATIENT)
Dept: ONCOLOGY | Facility: CLINIC | Age: 86
End: 2021-01-29

## 2021-01-29 DIAGNOSIS — D64.9 NORMOCYTIC ANEMIA: ICD-10-CM

## 2021-01-29 DIAGNOSIS — D50.9 IRON DEFICIENCY ANEMIA, UNSPECIFIED IRON DEFICIENCY ANEMIA TYPE: Primary | ICD-10-CM

## 2021-01-29 DIAGNOSIS — R63.4 WEIGHT LOSS, ABNORMAL: ICD-10-CM

## 2021-01-29 DIAGNOSIS — C67.9 MALIGNANT NEOPLASM OF URINARY BLADDER, UNSPECIFIED SITE (HCC): ICD-10-CM

## 2021-01-29 DIAGNOSIS — E60 ZINC DEFICIENCY: ICD-10-CM

## 2021-01-29 PROCEDURE — 99442 PR PHYS/QHP TELEPHONE EVALUATION 11-20 MIN: CPT | Performed by: NURSE PRACTITIONER

## 2021-01-29 RX ORDER — AZELASTINE 1 MG/ML
2 SPRAY, METERED NASAL 2 TIMES DAILY
COMMUNITY

## 2021-01-29 NOTE — PROGRESS NOTES
Kat Kenney  [id number]  1/3/1932  1/29/2021    Referring Provider:   Dr. Evelyn Douglas    Reason for Followup:   Anemia    Chief Complaint:  Follow up of Anemia    History of Present Illness:  Kat Kenney is a very pleasant 88 y.o.  female who presents in follow up appointment for further management and evaluation of normocytic anemia.     She was found to have anemia while residing in the nursing home for rehabilitation after knee replacement at the end of 2016. At the time of her initial consultation she had left [facility] and was then living at home and given her persistent anemia she was referred to our clinic for further evaluation. She was previously placed on oral iron for a history of iron deficiency however was unable to tolerate the medication due to intolerance and is now currently taking a multivitamin containing iron. She had previously followed with Dr. Morris for possible underlying malignancy but reports that workup was negative (including a bone marrow biopsy) and she had not followed with him in for two years prior to her initial consultation with me with the exception in that she followed with him October 2016 where she received 2 units of packed red blood cells for her anemia. She has not required further blood transfusions since that time. She did also have a bone marrow in 2006 which did reveal a mildly hypercellular marrow for her age with trilineage hematopoiesis. She also has been following with Dr. Kenney for hematuria and frequent urinary tract infections and underwent a cystoscopy as well as TURBT and pathology was significant for bladder cancer with no detrusor invasion. Chemotherapy or radiation was not recommended and she follows with Dr. Kenney every 3 months and recently underwent imaging that was negative. She states that she was evaluated by Dr. Kenney last summer however we contacted their office to obtain records and she had not been seen since 2018.     She has been  having unintentional weight loss since 2019 and more so since earlier this year (2020). She states that at first it was felt to be due to decreased oral intake due to ill fitting dentures and later felt to be due to her thyroid medication. However she has continued to lose weight despite adjustments. She reports that she has had imaging of her chest at [facility] with the last in Feb 2020 for chronic cough without any underlying cause seen. Her primary provider is also making a referral to ENT. She has also followed with Dr. Cunningham in gastroenterology and had an EGD/colonoscopy without active bleeding and significant for a hiatal hernia. She also continues to follow with Dr. Luz at the [facility] for polyarthritis for which she is currently taking methotrexate and folic acid. She otherwise denies of any fevers, night sweats, lymphadenopathy.    Interim History:  Ms. Kenney is following up of anemia via telephone today per patient request and consents to telephone visit. Since her last visit she reports she has been doing well and feels better than ever. She has been working with physical therapy at home which has helped improve her strength. She reports good appetite and has gained weight, now weighing 148 lbs. She remains on MVI without oral iron at present. She has chronic fatigue but denies any recent worsening. She denies any other complaints today.       Allergies   Allergen Reactions   • Benadryl [Diphenhydramine Hcl (Sleep)]    • Levaquin [Levofloxacin]    • Penicillins    • Zocor [Simvastatin] Unknown (See Comments)     cardioligist took her off of this and said she was allergic       Past Medical History:   Diagnosis Date   • Anemia    • Asthma    • Disease of thyroid gland    • GERD (gastroesophageal reflux disease)    • Hypertension    Coronary artery disease s/p stent placement   Sick sinus syndrome  Hypothryoidism  Hyperlipidemia  Inflammatory arthritis  Bladder Cancer  completely excised       Past Surgical History:   Procedure Laterality Date   • BLADDER TUMOR EXCISION  07/30/2018   • BREAST BIOPSY Left    • HYSTERECTOMY     • REPLACEMENT TOTAL KNEE     • SINUS SURGERY     Pacemaker        Social History     Socioeconomic History   • Marital status:      Spouse name: Not on file   • Number of children: Not on file   • Years of education: Not on file   • Highest education level: Not on file   Tobacco Use   • Smoking status: Never Smoker   • Smokeless tobacco: Never Used   Substance and Sexual Activity   • Alcohol use: No   • Drug use: No   She lives by herself.    Family History   Problem Relation Age of Onset   • Breast cancer Sister    • Cancer Brother          Current Outpatient Medications:   •  Albuterol Sulfate (PROAIR HFA IN), Inhale 2 puffs 2 (two) times a day., Disp: , Rfl:   •  aspirin 81 MG EC tablet, Take 81 mg by mouth daily., Disp: , Rfl:   •  atenolol (TENORMIN) 25 MG tablet, Take 25 mg by mouth Daily., Disp: , Rfl:   •  atorvastatin (LIPITOR) 20 MG tablet, Take 20 mg by mouth daily., Disp: , Rfl:   •  conjugated estrogens (PREMARIN) 0.625 MG/GM vaginal cream, Insert 0.5 g into the vagina daily., Disp: , Rfl:   •  Fluticasone Furoate-Vilanterol (BREO ELLIPTA IN), Inhale., Disp: , Rfl:   •  folic acid (FOLVITE) 1 MG tablet, Take 1 mg by mouth daily., Disp: , Rfl:   •  hydrochlorothiazide (MICROZIDE) 12.5 MG capsule, Take 12.5 mg by mouth daily., Disp: , Rfl:   •  levothyroxine (SYNTHROID, LEVOTHROID) 100 MCG tablet, Take 100 mcg by mouth daily., Disp: , Rfl:   •  losartan (COZAAR) 100 MG tablet, Take 100 mg by mouth Daily., Disp: , Rfl:   •  methotrexate 2.5 MG tablet, Take 2.5 mg by mouth 1 (one) time per week., Disp: , Rfl:   •  O2 (OXYGEN), Inhale 2 L/min every night., Disp: , Rfl:   •  omeprazole (PriLOSEC) 40 MG capsule, Take 40 mg by mouth daily., Disp: , Rfl:   •  oxybutynin XL (DITROPAN-XL) 5 MG 24 hr tablet, Take 5 mg by mouth 2 (Two) Times a Day.,  Disp: , Rfl:   •  Ped Multivitamins-Fl-Iron (MULTIVITAMIN WITH FLUORIDE/IRON) 0.25-10 MG/ML solution solution, Take  by mouth Daily., Disp: , Rfl:   •  Probiotic Product (PROBIOTIC DAILY PO), Take 1 tablet by mouth daily., Disp: , Rfl:   •  rOPINIRole (REQUIP) 0.25 MG tablet, Take 0.25 mg by mouth Every Night. Take 1 hour before bedtime., Disp: , Rfl:   •  sodium chloride (OCEAN) 0.65 % nasal spray, 1 spray into each nostril as needed for congestion., Disp: , Rfl:       Review of Systems:  Pertinent positives are listed as per history of present of illness, all other systems reviewed and are negative.     Physical Exam:  Unable to obtain VS as patient was not in office today and evaluated via telephone visit.   General:  Awake, alert and oriented, in no acute distress.   HEENT:  Normal hearing on telephone.   Resp:  No auditory wheezing  Neuro: MS as above.    Labs / Studies:  LABS:              ENDOSCOPIC REPORTS:  2019:              IMAGIN/17/20:  CT CHEST WITH CONTRAST: Contrast was injected intravenously and spiral scans were obtained from the apices of the lung and extended to the diaphragm. Both lung and soft tissue windows were archived. On the lung windows, the lungs are generally hyperinflated. There are granulomas seen scattered throughout both lungs. There are calcified lymph nodes in the mediastinum and hilar areas. There is some minimal atelectasis in the left lung base. No pulmonary parenchymal lung nodules to suggest metastatic disease were demonstrated. On the mediastinal windows, there were no enlarged lymph nodes that were not calcified in the mediastinum or hilar areas. There is a large hiatal hernia of the stomach measuring approximately 9 cm in diameter. IMPRESSION: No CT findings to suggest metastatic disease in the chest. There are multiple granulomas in the lungs and calcifications in the mediastinal lymph nodes. There is a large hiatal hernia of the  stomach.     CT ABDOMEN PELVIS W CONTRAST: Images obtained through the lower chest show a large hiatal hernia of the stomach in the lower mediastinum. The hernia has a diameter of approximately 9 cm. There are multiple granulomas noted in the spleen. The spleen was not enlarged. The liver showed no evidence of focal abnormality. There is no thickening of the gallbladder wall or dilatation of the bile ducts in the liver. The pancreas showed no evidence of mass or inflammation. The kidneys showed no evidence of obstruction. No focal renal parenchymal abnormalities were demonstrated. The aorta is normal in caliber. No enlarged lymph nodes were noted. The bowel shows no evidence of obstruction. There is a moderate amount of gas and fecal material throughout the colon suggesting constipation. There is no ascites in the abdomen. In the pelvis, the urinary bladder is moderately distended with urine. There is no thickening of the bladder wall. No masses or fluid collections were seen in the pelvis. IMPRESSION: No CT findings of metastatic disease. Urinary bladder is moderately distended with urine but showed no focal bladder wall abnormalities. There is a large amount of gas and stool in the colon suggesting constipation. There is a very large hiatal hernia of the stomach in the lower mediastinum.          PATHOLOGY:  09/02/16: Peripheral Smear: Normocytic anemia with mild anisopoikilocytosis. Normal white blood cell count and morphology, adequate platelets.  02/09/17: Peripheral Smear:          12/19/16: Flow cytometry:         03/29/17: Flow Cytometry:        06/21/18: Flow Cytometry:        Assessment/Plan :  Kat Kenney is a very pleasant 88 y.o.  female who presents in follow up appointment for further management and evaluation of normocytic anemia.      Normocytic Anemia  - This is likely multifactorial and secondary to iron deficiency versus anemia of chronic inflammation from her arthritis versus  medications (MTX) versus blood loss from previous hematuria (now resolved) which are all likely contributing.   - A complete blood count continued to show ongoing anemia however this slowly improved since resolution of hematuria. She did receive 2 units of packed red blood cells at Dr. Márquez office on 10/2016. Her serum iron and iron saturation were low with a ferritin of 44 during that visit and she likely has some component of iron deficiency along with anemia of chronic inflammation.   - B12 and folate have been normal.   - She had a negative GREY and did not show signs of hemolysis. ESR was elevated which is likely secondary to her underlying inflammatory arthritis which can also cause anemia of chronic inflammation and hence bone marrow suppression. Acute hepatitis panel non reactive.  - Given thus far negative workup, SPEP showed no evidence of Mspike and there was no abnormal immunofixation. Her serum free light chains were both elevated with a normal ratio and can be elevated in the setting of underlying inflammation.   - Possible that her inflammatory arthritis is causing inflammation which is suppressing her bone marrow and thus contributing to her anemia. Also obtained a rheumatoid factor and SUDEEP which were both negative. She continues to follow with Rheumatology.  - Obtained a flow cytometry which was significant for increased atypical NK cells and was unsure of its significance increase in eosinophils and could be secondary to a reactive underlying process such as her inflammatory arthritis vs allergies vs neoplastic. We have discussed with her the possibility of underlying bone marrow process that would require bone marrow biopsy to further evaluate. However, given stable counts at present she would like to continue to monitor and does not want to pursue bone marrrow.    - On repeat flow cytometry NK cells were no longer noted however she continued to have an elevated eosinophil level - suspected this  was likely secondary to her severe underlying allergies versus bladder cancer. She was treated empirically for an underlying parasitic infection with Ivermectin. Her eosinophils later normalized and are again elevated, and we have repeatedly discussed bone marrow biopsy and at present she is not interested.   - CBC obtained at outside hospital on 1/7/21 shows an ongoing anemia with Hb 9.2 which is stable to improved. Previously her iron levels were elevated; therefore, she was asked to stop MVI containing iron. Repeat iron panel shows low iron saturation and low normal ferritin of 30 g/dL. Therefore, ok to resume MVI with oral iron which she tolerates well. Will continue to monitor. She will follow up with PCP as previously planned and follow up with Dr. Powell in 3 months as scheduled with repeat labs prior.      Weight Loss  Due to eosinophilia, weight loss, chronic cough, and history of bladder malignancy, obtained a CT chest, abdomen, and pelvis which did not show any evidence of metastatic disease. Since her last visit, she has reportedly gained weight, now weighing 148 lbs. Will monitor.     Bladder Malignancy  - Patient reports this was completely excised without muscular involvement and adjuvant therapy with chemotherapy or radiation was not recommended.  - She follows with Dr. Kenney every 3 months in [place], [place] with [facility] with repeat imaging. She reports that her most recent appointment went well without any evidence of recurrence. She will follow up again next month.     Iron deficiency  Zinc deficiency  - She was previously on iron supplementation however was unable to tolerate.  - Copper normal, however zinc was low. Continue zinc tablet which has improved her levels. She continues to be on folic acid as she is currently on methotrexate. Previously her iron levels were elevated; therefore, she was asked to stop MVI containing iron. Repeat iron panel shows low iron  saturation and low normal ferritin of 30 g/dL. Therefore, she was asked to resume MVI with oral iron which she tolerates well. Will continue to monitor and recheck iron panel/ferritin prior to next follow up.     ACO / JAXSON/Other  Quality measures  -  Kat Kenney received 2020 flu vaccine.  -  Kat Kenney reports a pain score of 0.    -  Current outpatient and discharge medications have been reconciled for the patient.  Reviewed by: ANTONIO Rosa      This visit has been rescheduled as a phone visit to comply with patient safety concerns in accordance with CDC recommendations. Total time of discussion was 20 minutes.      ANTONIO Rosa  01/29/21  14:17 EST

## 2021-05-13 ENCOUNTER — OFFICE VISIT (OUTPATIENT)
Dept: ONCOLOGY | Facility: CLINIC | Age: 86
End: 2021-05-13

## 2021-05-13 ENCOUNTER — APPOINTMENT (OUTPATIENT)
Dept: MAMMOGRAPHY | Facility: HOSPITAL | Age: 86
End: 2021-05-13

## 2021-05-13 VITALS
HEART RATE: 70 BPM | OXYGEN SATURATION: 94 % | SYSTOLIC BLOOD PRESSURE: 132 MMHG | DIASTOLIC BLOOD PRESSURE: 63 MMHG | WEIGHT: 158 LBS | RESPIRATION RATE: 18 BRPM | TEMPERATURE: 97.3 F

## 2021-05-13 DIAGNOSIS — D64.9 NORMOCYTIC ANEMIA: ICD-10-CM

## 2021-05-13 DIAGNOSIS — R53.82 CHRONIC FATIGUE: ICD-10-CM

## 2021-05-13 DIAGNOSIS — E87.1 HYPONATREMIA: ICD-10-CM

## 2021-05-13 DIAGNOSIS — R79.89 TSH ELEVATION: ICD-10-CM

## 2021-05-13 DIAGNOSIS — D50.9 IRON DEFICIENCY ANEMIA, UNSPECIFIED IRON DEFICIENCY ANEMIA TYPE: Primary | ICD-10-CM

## 2021-05-13 LAB
ALBUMIN SERPL-MCNC: 3.88 G/DL (ref 3.5–5.2)
ALBUMIN/GLOB SERPL: 1.2 G/DL
ALP SERPL-CCNC: 111 U/L (ref 39–117)
ALT SERPL W P-5'-P-CCNC: 13 U/L (ref 1–33)
ANION GAP SERPL CALCULATED.3IONS-SCNC: 7.8 MMOL/L (ref 5–15)
AST SERPL-CCNC: 18 U/L (ref 1–32)
BASOPHILS # BLD AUTO: 0.05 10*3/MM3 (ref 0–0.2)
BASOPHILS NFR BLD AUTO: 0.8 % (ref 0–1.5)
BILIRUB SERPL-MCNC: 0.3 MG/DL (ref 0–1.2)
BUN SERPL-MCNC: 14 MG/DL (ref 8–23)
BUN/CREAT SERPL: 19.7 (ref 7–25)
CALCIUM SPEC-SCNC: 8.5 MG/DL (ref 8.6–10.5)
CHLORIDE SERPL-SCNC: 85 MMOL/L (ref 98–107)
CO2 SERPL-SCNC: 26.2 MMOL/L (ref 22–29)
CREAT SERPL-MCNC: 0.71 MG/DL (ref 0.57–1)
DEPRECATED RDW RBC AUTO: 47.3 FL (ref 37–54)
EOSINOPHIL # BLD AUTO: 0.43 10*3/MM3 (ref 0–0.4)
EOSINOPHIL NFR BLD AUTO: 7.3 % (ref 0.3–6.2)
ERYTHROCYTE [DISTWIDTH] IN BLOOD BY AUTOMATED COUNT: 13.8 % (ref 12.3–15.4)
FERRITIN SERPL-MCNC: 101.8 NG/ML (ref 13–150)
GFR SERPL CREATININE-BSD FRML MDRD: 78 ML/MIN/1.73
GLOBULIN UR ELPH-MCNC: 3.2 GM/DL
GLUCOSE SERPL-MCNC: 112 MG/DL (ref 65–99)
HCT VFR BLD AUTO: 28.3 % (ref 34–46.6)
HGB BLD-MCNC: 9.7 G/DL (ref 12–15.9)
IMM GRANULOCYTES # BLD AUTO: 0.02 10*3/MM3 (ref 0–0.05)
IMM GRANULOCYTES NFR BLD AUTO: 0.3 % (ref 0–0.5)
IRON 24H UR-MRATE: 62 MCG/DL (ref 37–145)
IRON SATN MFR SERPL: 15 % (ref 20–50)
LDH SERPL-CCNC: 269 U/L (ref 135–214)
LYMPHOCYTES # BLD AUTO: 2.22 10*3/MM3 (ref 0.7–3.1)
LYMPHOCYTES NFR BLD AUTO: 37.6 % (ref 19.6–45.3)
MCH RBC QN AUTO: 32.7 PG (ref 26.6–33)
MCHC RBC AUTO-ENTMCNC: 34.3 G/DL (ref 31.5–35.7)
MCV RBC AUTO: 95.3 FL (ref 79–97)
MONOCYTES # BLD AUTO: 0.67 10*3/MM3 (ref 0.1–0.9)
MONOCYTES NFR BLD AUTO: 11.3 % (ref 5–12)
NEUTROPHILS NFR BLD AUTO: 2.52 10*3/MM3 (ref 1.7–7)
NEUTROPHILS NFR BLD AUTO: 42.7 % (ref 42.7–76)
NRBC BLD AUTO-RTO: 0 /100 WBC (ref 0–0.2)
PLATELET # BLD AUTO: 253 10*3/MM3 (ref 140–450)
PMV BLD AUTO: 8 FL (ref 6–12)
POTASSIUM SERPL-SCNC: 4.8 MMOL/L (ref 3.5–5.2)
PROT SERPL-MCNC: 7.1 G/DL (ref 6–8.5)
RBC # BLD AUTO: 2.97 10*6/MM3 (ref 3.77–5.28)
RETICS # AUTO: 0.06 10*6/MM3 (ref 0.02–0.13)
RETICS/RBC NFR AUTO: 2.01 % (ref 0.7–1.9)
SODIUM SERPL-SCNC: 119 MMOL/L (ref 136–145)
T4 FREE SERPL-MCNC: 1.18 NG/DL (ref 0.93–1.7)
TIBC SERPL-MCNC: 402 MCG/DL (ref 298–536)
TRANSFERRIN SERPL-MCNC: 270 MG/DL (ref 200–360)
TSH SERPL DL<=0.05 MIU/L-ACNC: 8.01 UIU/ML (ref 0.27–4.2)
WBC # BLD AUTO: 5.91 10*3/MM3 (ref 3.4–10.8)

## 2021-05-13 PROCEDURE — 82746 ASSAY OF FOLIC ACID SERUM: CPT | Performed by: INTERNAL MEDICINE

## 2021-05-13 PROCEDURE — 84439 ASSAY OF FREE THYROXINE: CPT | Performed by: INTERNAL MEDICINE

## 2021-05-13 PROCEDURE — 86334 IMMUNOFIX E-PHORESIS SERUM: CPT | Performed by: INTERNAL MEDICINE

## 2021-05-13 PROCEDURE — 85060 BLOOD SMEAR INTERPRETATION: CPT | Performed by: INTERNAL MEDICINE

## 2021-05-13 PROCEDURE — 82784 ASSAY IGA/IGD/IGG/IGM EACH: CPT | Performed by: INTERNAL MEDICINE

## 2021-05-13 PROCEDURE — 99214 OFFICE O/P EST MOD 30 MIN: CPT | Performed by: INTERNAL MEDICINE

## 2021-05-13 PROCEDURE — 84466 ASSAY OF TRANSFERRIN: CPT | Performed by: INTERNAL MEDICINE

## 2021-05-13 PROCEDURE — 82728 ASSAY OF FERRITIN: CPT | Performed by: INTERNAL MEDICINE

## 2021-05-13 PROCEDURE — 84165 PROTEIN E-PHORESIS SERUM: CPT | Performed by: INTERNAL MEDICINE

## 2021-05-13 PROCEDURE — 84443 ASSAY THYROID STIM HORMONE: CPT | Performed by: INTERNAL MEDICINE

## 2021-05-13 PROCEDURE — 83883 ASSAY NEPHELOMETRY NOT SPEC: CPT | Performed by: INTERNAL MEDICINE

## 2021-05-13 PROCEDURE — 82607 VITAMIN B-12: CPT | Performed by: INTERNAL MEDICINE

## 2021-05-13 PROCEDURE — 85045 AUTOMATED RETICULOCYTE COUNT: CPT | Performed by: INTERNAL MEDICINE

## 2021-05-13 PROCEDURE — 83615 LACTATE (LD) (LDH) ENZYME: CPT | Performed by: INTERNAL MEDICINE

## 2021-05-13 PROCEDURE — 85025 COMPLETE CBC W/AUTO DIFF WBC: CPT | Performed by: INTERNAL MEDICINE

## 2021-05-13 PROCEDURE — 80053 COMPREHEN METABOLIC PANEL: CPT | Performed by: INTERNAL MEDICINE

## 2021-05-13 PROCEDURE — 36415 COLL VENOUS BLD VENIPUNCTURE: CPT | Performed by: INTERNAL MEDICINE

## 2021-05-13 PROCEDURE — 84630 ASSAY OF ZINC: CPT | Performed by: INTERNAL MEDICINE

## 2021-05-13 PROCEDURE — 83540 ASSAY OF IRON: CPT | Performed by: INTERNAL MEDICINE

## 2021-05-13 PROCEDURE — 84155 ASSAY OF PROTEIN SERUM: CPT | Performed by: INTERNAL MEDICINE

## 2021-05-13 PROCEDURE — 83010 ASSAY OF HAPTOGLOBIN QUANT: CPT | Performed by: INTERNAL MEDICINE

## 2021-05-13 NOTE — PROGRESS NOTES
Kat Kenney  [id number]  1/3/1932  5/13/2021      Referring Provider:   Dr. Evelyn Douglas    Reason for Followup:   Normocytic Anemia    Chief Complaint:  Normocytic Anemia      History of Present Illness:  Kat Kenney is a very pleasant 89 y.o.  female who presents in follow up appointment for further management and evaluation of normocytic anemia.     She was found to have anemia while residing in the nursing home for rehabilitation after knee replacement at the end of 2016. At the time of her initial consultation she had left [facility] and was then living at home and given her persistent anemia she was referred to our clinic for further evaluation. She was previously placed on oral iron for a history of iron deficiency however was unable to tolerate the medication due to intolerance and is now currently taking a multivitamin containing iron. She had previously followed with Dr. Morris for possible underlying malignancy but reports that workup was negative (including a bone marrow biopsy) and she had not followed with him in for two years prior to her initial consultation with me with the exception in that she followed with him October 2016 where she received 2 units of packed red blood cells for her anemia. She has not required further blood transfusions since that time. She did also have a bone marrow in 2006 which did reveal a mildly hypercellular marrow for her age with trilineage hematopoiesis. She also has been following with Dr. Kenney for hematuria and frequent urinary tract infections and underwent a cystoscopy as well as TURBT and pathology was significant for bladder cancer with no detrusor invasion. Chemotherapy or radiation was not recommended and she follows with Dr. Kenney every 3 months and recently underwent imaging that was negative. She states that she was evaluated by Dr. Kenney last summer however we contacted their office to obtain records and she had not been seen since 2018.     She  has been having unintentional weight loss since 2019 and more so since earlier this year (2020). She states that at first it was felt to be due to decreased oral intake due to ill fitting dentures and later felt to be due to her thyroid medication. However she has continued to lose weight despite adjustments. She reports that she has had imaging of her chest at [facility] with the last in Feb 2020 for chronic cough without any underlying cause seen. Her primary provider is also making a referral to ENT. She has also followed with Dr. Cunningham in gastroenterology and had an EGD/colonoscopy without active bleeding and significant for a hiatal hernia. She also continues to follow with Dr. Luz at the [facility] for polyarthritis for which she is currently taking methotrexate and folic acid. She otherwise denies of any fevers, night sweats, lymphadenopathy.    Interim History:  She recently broke her foot after a fall and misstep and is in a wheelchair today. She has follow up with Dr. Kenney in July in regards to her bladder cancer. She denies of any further weight loss. Denies of any bleeding.         The following portions of the patient's history were reviewed and updated as appropriate: allergies, current medications, past family history, past medical history, past social history, past surgical history and problem list.    Allergies   Allergen Reactions   • Benadryl [Diphenhydramine Hcl (Sleep)]    • Levaquin [Levofloxacin]    • Penicillins    • Zocor [Simvastatin] Unknown (See Comments)     cardioligist took her off of this and said she was allergic       Past Medical History:   Diagnosis Date   • Anemia    • Asthma    • Disease of thyroid gland    • GERD (gastroesophageal reflux disease)    • Hypertension    Coronary artery disease s/p stent placement   Sick sinus syndrome  Hypothryoidism  Hyperlipidemia  Inflammatory arthritis  Bladder Cancer completely excised         Past Surgical History:    Procedure Laterality Date   • BLADDER TUMOR EXCISION  07/30/2018   • BREAST BIOPSY Left    • HYSTERECTOMY     • REPLACEMENT TOTAL KNEE     • SINUS SURGERY     Pacemaker        Social History     Socioeconomic History   • Marital status:      Spouse name: Not on file   • Number of children: Not on file   • Years of education: Not on file   • Highest education level: Not on file   Tobacco Use   • Smoking status: Never Smoker   • Smokeless tobacco: Never Used   Substance and Sexual Activity   • Alcohol use: No   • Drug use: No   She lives by herself.    Family History   Problem Relation Age of Onset   • Breast cancer Sister    • Cancer Brother          Current Outpatient Medications:   •  Albuterol Sulfate (PROAIR HFA IN), Inhale 2 puffs 2 (two) times a day., Disp: , Rfl:   •  aspirin 81 MG EC tablet, Take 81 mg by mouth daily., Disp: , Rfl:   •  atenolol (TENORMIN) 25 MG tablet, Take 25 mg by mouth Daily., Disp: , Rfl:   •  atorvastatin (LIPITOR) 20 MG tablet, Take 20 mg by mouth daily., Disp: , Rfl:   •  azelastine (ASTELIN) 0.1 % nasal spray, 2 sprays into the nostril(s) as directed by provider 2 (Two) Times a Day. Use in each nostril as directed, Disp: , Rfl:   •  conjugated estrogens (PREMARIN) 0.625 MG/GM vaginal cream, Insert 0.5 g into the vagina daily., Disp: , Rfl:   •  Fluticasone Furoate-Vilanterol (BREO ELLIPTA IN), Inhale., Disp: , Rfl:   •  folic acid (FOLVITE) 1 MG tablet, Take 1 mg by mouth daily., Disp: , Rfl:   •  hydrochlorothiazide (MICROZIDE) 12.5 MG capsule, Take 12.5 mg by mouth daily., Disp: , Rfl:   •  levothyroxine (SYNTHROID, LEVOTHROID) 100 MCG tablet, Take 100 mcg by mouth daily., Disp: , Rfl:   •  losartan (COZAAR) 100 MG tablet, Take 100 mg by mouth Daily., Disp: , Rfl:   •  methotrexate 2.5 MG tablet, Take 2.5 mg by mouth 1 (one) time per week., Disp: , Rfl:   •  O2 (OXYGEN), Inhale 2 L/min every night., Disp: , Rfl:   •  omeprazole (PriLOSEC) 40 MG capsule, Take 40 mg by mouth  daily., Disp: , Rfl:   •  oxybutynin XL (DITROPAN-XL) 5 MG 24 hr tablet, Take 5 mg by mouth 2 (Two) Times a Day., Disp: , Rfl:   •  Ped Multivitamins-Fl-Iron (MULTIVITAMIN WITH FLUORIDE/IRON) 0.25-10 MG/ML solution solution, Take  by mouth Daily., Disp: , Rfl:   •  Probiotic Product (PROBIOTIC DAILY PO), Take 1 tablet by mouth daily., Disp: , Rfl:   •  rOPINIRole (REQUIP) 0.25 MG tablet, Take 0.25 mg by mouth Every Night. Take 1 hour before bedtime., Disp: , Rfl:   •  sodium chloride (OCEAN) 0.65 % nasal spray, 1 spray into each nostril as needed for congestion., Disp: , Rfl:         Review of Systems:  Pertinent positives are listed as per history of present of illness, all other systems reviewed and are negative. Foot pain       Physical Exam:  Vital Signs: These were reviewed and listed as per patient’s electronic medical chart  Vitals:    21 1433   BP: 132/63   Pulse: 70   Resp: 18   Temp: 97.3 °F (36.3 °C)   SpO2: 94%     General: Awake, alert and oriented, in no distress  HEENT: Head is atraumatic, normocephalic, extraocular movements full, no scleral icterus  Neck: supple, no jvd, lymphadenopathy or masses  Cardiovascular: regular rate and rhythm without murmurs, rubs or gallops  Pulmonary: non-labored, clear to auscultation bilaterally, no wheezing  Abdomen: soft, non-tender, non-distended, normal active bowel sounds present  Extremities: No clubbing, cyanosis or edema  Lymph: No cervical, supraclavicular adenopathy  Neurologic: Mental status as above, alert, awake and oriented, grossly non-focal exam  Skin: warm, dry, intact            Labs / Studies:  LABS:            ENDOSCOPIC REPORTS:  2019:              IMAGIN/17/20:  CT CHEST WITH CONTRAST: Contrast was injected intravenously and spiral scans were obtained from the apices of the lung and extended to the diaphragm. Both lung and soft tissue windows were archived. On the lung windows, the lungs are generally  hyperinflated. There are granulomas seen scattered throughout both lungs. There are calcified lymph nodes in the mediastinum and hilar areas. There is some minimal atelectasis in the left lung base. No pulmonary parenchymal lung nodules to suggest metastatic disease were demonstrated. On the mediastinal windows, there were no enlarged lymph nodes that were not calcified in the mediastinum or hilar areas. There is a large hiatal hernia of the stomach measuring approximately 9 cm in diameter. IMPRESSION: No CT findings to suggest metastatic disease in the chest. There are multiple granulomas in the lungs and calcifications in the mediastinal lymph nodes. There is a large hiatal hernia of the stomach.     CT ABDOMEN PELVIS W CONTRAST: Images obtained through the lower chest show a large hiatal hernia of the stomach in the lower mediastinum. The hernia has a diameter of approximately 9 cm. There are multiple granulomas noted in the spleen. The spleen was not enlarged. The liver showed no evidence of focal abnormality. There is no thickening of the gallbladder wall or dilatation of the bile ducts in the liver. The pancreas showed no evidence of mass or inflammation. The kidneys showed no evidence of obstruction. No focal renal parenchymal abnormalities were demonstrated. The aorta is normal in caliber. No enlarged lymph nodes were noted. The bowel shows no evidence of obstruction. There is a moderate amount of gas and fecal material throughout the colon suggesting constipation. There is no ascites in the abdomen. In the pelvis, the urinary bladder is moderately distended with urine. There is no thickening of the bladder wall. No masses or fluid collections were seen in the pelvis. IMPRESSION: No CT findings of metastatic disease. Urinary bladder is moderately distended with urine but showed no focal bladder wall abnormalities. There is a large amount of gas and stool in the colon suggesting constipation. There is a very  large hiatal hernia of the stomach in the lower mediastinum.          PATHOLOGY:  09/02/16: Peripheral Smear: Normocytic anemia with mild anisopoikilocytosis. Normal white blood cell count and morphology, adequate platelets.  02/09/17: Peripheral Smear:          12/19/16: Flow cytometry:         03/29/17: Flow Cytometry:        06/21/18: Flow Cytometry:            Assessment/Plan :  Kat Kenney is a very pleasant 89 y.o.  female who presents in follow up appointment for further management and evaluation of normocytic anemia.      Normocytic Anemia  - This is likely multifactorial and secondary to iron deficiency versus anemia of chronic inflammation from her arthritis versus medications (MTX) versus blood loss from previous hematuria (now resolved) which are all likely contributing.   - A complete blood count continued to show ongoing anemia however this slowly improved since resolution of hematuria. She did receive 2 units of packed red blood cells at Dr. Márquez office on 10/2016. Her serum iron and iron saturation were low with a ferritin of 44 during that visit and she likely has some component of iron deficiency along with anemia of chronic inflammation.   - Today her iron panel continues to be within normal limits without supplementation (she is only on a MVI containing iron, she previously took oral iron and unable to tolerate).   - B12 and folate have also been normal.   - She had a negative GREY and did not show signs of hemolysis. ESR was elevated which is likely secondary to her underlying inflammatory arthritis which can also cause anemia of chronic inflammation and hence bone marrow suppression. Acute hepatitis panel non reactive.  - Given thus far negative workup, SPEP showed no evidence of Mspike and there was no abnormal immunofixation. Her serum free light chains were both elevated with a normal ratio and can be elevated in the setting of underlying inflammation.   - Possible that her  inflammatory arthritis is causing inflammation which is suppressing her bone marrow and thus contributing to her anemia. Also obtained a rheumatoid factor and SUDEEP which were both negative. She continues to follow with Rheumatology.  - Obtained a flow cytometry which was significant for increased atypical NK cells and was unsure of its significance increase in eosinophils and could be secondary to a reactive underlying process such as her inflammatory arthritis vs allergies vs neoplastic. We have discussed with her the possibility of underlying bone marrow process that would require bone marrow biopsy to further evaluate. However, given stable counts at present she would like to continue to monitor and does not want to pursue bone marrrow.    - On repeat flow cytometry NK cells were no longer noted however she continued to have an elevated eosinophil level - suspected this was likely secondary to her severe underlying allergies versus bladder cancer. She was treated empirically for an underlying parasitic infection with Ivermectin. Her eosinophils later normalized and are again elevated, and we have repeatedly discussed bone marrow biopsy and at present she is not interested.  - Anemia is stable at present.     Severe Hyponatremia  - This is likely contributing to her ongoing fatigue. I recommended ED evaluation given severity and that it is new on my lab evaluation. However they prefer to follow PCP and will make an appointment for tomorrow or early next week for recheck. They understand should she have worsening symptoms she must seek immediate medical attention.     TSH elevation   - Will add FT4, she is currently on levothyroxine and discuss her case with PCP.    Bladder Malignancy  Weight loss now resolved  - Patient reports this was completely excised without muscular involvement and adjuvant therapy with chemotherapy or radiation was not recommended.  - She follows with Dr. Kenney every 3 months in [place], [place]  with [facility] with repeat imaging. She reports that her most recent appointment last month went well without any evidence of recurrence. We have been unable to get records since 2018. Will again request records after her appointment in July 2021.   - Her weight loss was initially attributed to decreased oral intake due to ill fitting dentures and later to thyroid issues, and later with a good appetite she continued to lose weight. Therefore, given eosinophilia, weight loss, chronic cough, and history of bladder malignancy I did obtain a CT chest, abdomen, and pelvis which did not show any evidence of metastatic disease. Multiple granulomas seen and she reports that this is chronic and known to her and was diagnosed after infection in childhood.     Iron deficiency  Zinc deficiency  - She was previously on iron supplementation however was unable to tolerate  - Copper normal, however zinc was low. She is currently on iron containing MVI and will discontinue her zinc supplementation. She continues to be on folic acid as she is currently on methotrexate. Iron studies elevated therefore oral iron was discontinued. She does remain on a MVI.     ACO Quality measures  -  Kat Kenney does not use tobacco products.  -  Kat Kenney received 2020 flu vaccine.  -  Kat Kenney reports a pain score of 4.  Given her pain assessment as noted, treatment options were discussed and the following options were decided upon as a follow-up plan to address the patient's pain: referral to Primary Care for assistance in pain treatment guidance.  -  Current outpatient and discharge medications have been reconciled for the patient.  Reviewed by: Rosmery Powell MD      I will have the patient return for follow up appointment as previously scheduled in three months with NP and in six months with myself. She understands that should she have any questions or concerns prior to her appointment she should give us a call at  any time and I would be happy to see her sooner. It was a pleasure to talk to this patient in clinic today, thank you for allowing me to participate in the care of this patient.        Rosmery Powell MD  05/13/21  08:54 EDT

## 2021-05-14 ENCOUNTER — HOSPITAL ENCOUNTER (EMERGENCY)
Facility: HOSPITAL | Age: 86
Discharge: HOME | End: 2021-05-14
Attending: STUDENT IN AN ORGANIZED HEALTH CARE EDUCATION/TRAINING PROGRAM | Admitting: FAMILY MEDICINE

## 2021-05-14 VITALS
SYSTOLIC BLOOD PRESSURE: 100 MMHG | HEIGHT: 65 IN | OXYGEN SATURATION: 100 % | WEIGHT: 153 LBS | TEMPERATURE: 98.6 F | DIASTOLIC BLOOD PRESSURE: 60 MMHG | HEART RATE: 87 BPM | BODY MASS INDEX: 25.49 KG/M2 | RESPIRATION RATE: 16 BRPM

## 2021-05-14 DIAGNOSIS — N30.00 ACUTE CYSTITIS WITHOUT HEMATURIA: ICD-10-CM

## 2021-05-14 DIAGNOSIS — E87.1 HYPONATREMIA: Primary | ICD-10-CM

## 2021-05-14 LAB
ALBUMIN SERPL ELPH-MCNC: 3.5 G/DL (ref 2.9–4.4)
ALBUMIN SERPL-MCNC: 4.04 G/DL (ref 3.5–5.2)
ALBUMIN/GLOB SERPL: 1.1 {RATIO} (ref 0.7–1.7)
ALBUMIN/GLOB SERPL: 1.4 G/DL
ALP SERPL-CCNC: 110 U/L (ref 39–117)
ALPHA1 GLOB SERPL ELPH-MCNC: 0.3 G/DL (ref 0–0.4)
ALPHA2 GLOB SERPL ELPH-MCNC: 0.9 G/DL (ref 0.4–1)
ALT SERPL W P-5'-P-CCNC: 12 U/L (ref 1–33)
ANION GAP SERPL CALCULATED.3IONS-SCNC: 9.4 MMOL/L (ref 5–15)
ANION GAP SERPL CALCULATED.3IONS-SCNC: 9.6 MMOL/L (ref 5–15)
APTT PPP: 31.3 SECONDS (ref 25.6–35.3)
AST SERPL-CCNC: 21 U/L (ref 1–32)
B-GLOBULIN SERPL ELPH-MCNC: 1.1 G/DL (ref 0.7–1.3)
BACTERIA UR QL AUTO: ABNORMAL /HPF
BASOPHILS # BLD AUTO: 0.04 10*3/MM3 (ref 0–0.2)
BASOPHILS NFR BLD AUTO: 0.7 % (ref 0–1.5)
BILIRUB SERPL-MCNC: 0.4 MG/DL (ref 0–1.2)
BILIRUB UR QL STRIP: NEGATIVE
BUN SERPL-MCNC: 11 MG/DL (ref 8–23)
BUN SERPL-MCNC: 13 MG/DL (ref 8–23)
BUN/CREAT SERPL: 16.2 (ref 7–25)
BUN/CREAT SERPL: 16.9 (ref 7–25)
CALCIUM SPEC-SCNC: 8.5 MG/DL (ref 8.6–10.5)
CALCIUM SPEC-SCNC: 9 MG/DL (ref 8.6–10.5)
CHLORIDE SERPL-SCNC: 87 MMOL/L (ref 98–107)
CHLORIDE SERPL-SCNC: 93 MMOL/L (ref 98–107)
CHLORIDE UR-SCNC: 45 MMOL/L
CLARITY UR: CLEAR
CO2 SERPL-SCNC: 22.6 MMOL/L (ref 22–29)
CO2 SERPL-SCNC: 24.4 MMOL/L (ref 22–29)
COLOR UR: YELLOW
CORTIS SERPL-MCNC: 12.33 MCG/DL
CREAT SERPL-MCNC: 0.68 MG/DL (ref 0.57–1)
CREAT SERPL-MCNC: 0.77 MG/DL (ref 0.57–1)
CYTOLOGIST CVX/VAG CYTO: NORMAL
DEPRECATED RDW RBC AUTO: 47.5 FL (ref 37–54)
EOSINOPHIL # BLD AUTO: 0.41 10*3/MM3 (ref 0–0.4)
EOSINOPHIL NFR BLD AUTO: 6.8 % (ref 0.3–6.2)
ERYTHROCYTE [DISTWIDTH] IN BLOOD BY AUTOMATED COUNT: 13.8 % (ref 12.3–15.4)
GAMMA GLOB SERPL ELPH-MCNC: 1 G/DL (ref 0.4–1.8)
GFR SERPL CREATININE-BSD FRML MDRD: 71 ML/MIN/1.73
GFR SERPL CREATININE-BSD FRML MDRD: 81 ML/MIN/1.73
GLOBULIN SER-MCNC: 3.3 G/DL (ref 2.2–3.9)
GLOBULIN UR ELPH-MCNC: 2.9 GM/DL
GLUCOSE SERPL-MCNC: 122 MG/DL (ref 65–99)
GLUCOSE SERPL-MCNC: 129 MG/DL (ref 65–99)
GLUCOSE UR STRIP-MCNC: NEGATIVE MG/DL
HCT VFR BLD AUTO: 28.4 % (ref 34–46.6)
HGB BLD-MCNC: 9.7 G/DL (ref 12–15.9)
HGB UR QL STRIP.AUTO: NEGATIVE
HOLD SPECIMEN: NORMAL
HOLD SPECIMEN: NORMAL
HYALINE CASTS UR QL AUTO: ABNORMAL /LPF
IGA SERPL-MCNC: 291 MG/DL (ref 64–422)
IGG SERPL-MCNC: 1046 MG/DL (ref 586–1602)
IGM SERPL-MCNC: 34 MG/DL (ref 26–217)
IMM GRANULOCYTES # BLD AUTO: 0.03 10*3/MM3 (ref 0–0.05)
IMM GRANULOCYTES NFR BLD AUTO: 0.5 % (ref 0–0.5)
INR PPP: 1.09 (ref 0.9–1.1)
INTERPRETATION SERPL IEP-IMP: NORMAL
KAPPA LC FREE SER-MCNC: 39.1 MG/L (ref 3.3–19.4)
KAPPA LC FREE/LAMBDA FREE SER: 1.2 {RATIO} (ref 0.26–1.65)
KETONES UR QL STRIP: NEGATIVE
LABORATORY COMMENT REPORT: NORMAL
LAMBDA LC FREE SERPL-MCNC: 32.7 MG/L (ref 5.7–26.3)
LEUKOCYTE ESTERASE UR QL STRIP.AUTO: ABNORMAL
LYMPHOCYTES # BLD AUTO: 2.16 10*3/MM3 (ref 0.7–3.1)
LYMPHOCYTES NFR BLD AUTO: 35.9 % (ref 19.6–45.3)
M PROTEIN SERPL ELPH-MCNC: NORMAL G/DL
MAGNESIUM SERPL-MCNC: 2 MG/DL (ref 1.6–2.4)
MCH RBC QN AUTO: 32.6 PG (ref 26.6–33)
MCHC RBC AUTO-ENTMCNC: 34.2 G/DL (ref 31.5–35.7)
MCV RBC AUTO: 95.3 FL (ref 79–97)
MONOCYTES # BLD AUTO: 0.57 10*3/MM3 (ref 0.1–0.9)
MONOCYTES NFR BLD AUTO: 9.5 % (ref 5–12)
NEUTROPHILS NFR BLD AUTO: 2.8 10*3/MM3 (ref 1.7–7)
NEUTROPHILS NFR BLD AUTO: 46.6 % (ref 42.7–76)
NITRITE UR QL STRIP: NEGATIVE
NRBC BLD AUTO-RTO: 0 /100 WBC (ref 0–0.2)
NT-PROBNP SERPL-MCNC: 340.1 PG/ML (ref 0–1800)
PATH INTERP BLD-IMP: NORMAL
PH UR STRIP.AUTO: 6.5 [PH] (ref 5–8)
PHOSPHATE SERPL-MCNC: 4.1 MG/DL (ref 2.5–4.5)
PLATELET # BLD AUTO: 260 10*3/MM3 (ref 140–450)
PMV BLD AUTO: 8.2 FL (ref 6–12)
POTASSIUM SERPL-SCNC: 4 MMOL/L (ref 3.5–5.2)
POTASSIUM SERPL-SCNC: 4.6 MMOL/L (ref 3.5–5.2)
POTASSIUM UR-SCNC: 18.1 MMOL/L
PROT SERPL-MCNC: 6.8 G/DL (ref 6–8.5)
PROT SERPL-MCNC: 6.9 G/DL (ref 6–8.5)
PROT UR QL STRIP: NEGATIVE
PROTHROMBIN TIME: 13.9 SECONDS (ref 11.9–14.1)
RBC # BLD AUTO: 2.98 10*6/MM3 (ref 3.77–5.28)
RBC # UR: ABNORMAL /HPF
REF LAB TEST METHOD: ABNORMAL
SODIUM SERPL-SCNC: 121 MMOL/L (ref 136–145)
SODIUM SERPL-SCNC: 125 MMOL/L (ref 136–145)
SODIUM UR-SCNC: 45 MMOL/L
SP GR UR STRIP: <=1.005 (ref 1–1.03)
SQUAMOUS #/AREA URNS HPF: ABNORMAL /HPF
TSH SERPL DL<=0.05 MIU/L-ACNC: 9.5 UIU/ML (ref 0.27–4.2)
UROBILINOGEN UR QL STRIP: ABNORMAL
WBC # BLD AUTO: 6.01 10*3/MM3 (ref 3.4–10.8)
WBC UR QL AUTO: ABNORMAL /HPF
WHOLE BLOOD HOLD SPECIMEN: NORMAL
WHOLE BLOOD HOLD SPECIMEN: NORMAL

## 2021-05-14 PROCEDURE — 87088 URINE BACTERIA CULTURE: CPT | Performed by: STUDENT IN AN ORGANIZED HEALTH CARE EDUCATION/TRAINING PROGRAM

## 2021-05-14 PROCEDURE — 36415 COLL VENOUS BLD VENIPUNCTURE: CPT

## 2021-05-14 PROCEDURE — 99283 EMERGENCY DEPT VISIT LOW MDM: CPT

## 2021-05-14 PROCEDURE — 83735 ASSAY OF MAGNESIUM: CPT | Performed by: STUDENT IN AN ORGANIZED HEALTH CARE EDUCATION/TRAINING PROGRAM

## 2021-05-14 PROCEDURE — 85025 COMPLETE CBC W/AUTO DIFF WBC: CPT | Performed by: STUDENT IN AN ORGANIZED HEALTH CARE EDUCATION/TRAINING PROGRAM

## 2021-05-14 PROCEDURE — 82533 TOTAL CORTISOL: CPT | Performed by: STUDENT IN AN ORGANIZED HEALTH CARE EDUCATION/TRAINING PROGRAM

## 2021-05-14 PROCEDURE — 81001 URINALYSIS AUTO W/SCOPE: CPT | Performed by: STUDENT IN AN ORGANIZED HEALTH CARE EDUCATION/TRAINING PROGRAM

## 2021-05-14 PROCEDURE — 96361 HYDRATE IV INFUSION ADD-ON: CPT

## 2021-05-14 PROCEDURE — 84300 ASSAY OF URINE SODIUM: CPT | Performed by: STUDENT IN AN ORGANIZED HEALTH CARE EDUCATION/TRAINING PROGRAM

## 2021-05-14 PROCEDURE — 85610 PROTHROMBIN TIME: CPT | Performed by: STUDENT IN AN ORGANIZED HEALTH CARE EDUCATION/TRAINING PROGRAM

## 2021-05-14 PROCEDURE — 83880 ASSAY OF NATRIURETIC PEPTIDE: CPT | Performed by: STUDENT IN AN ORGANIZED HEALTH CARE EDUCATION/TRAINING PROGRAM

## 2021-05-14 PROCEDURE — 82436 ASSAY OF URINE CHLORIDE: CPT | Performed by: STUDENT IN AN ORGANIZED HEALTH CARE EDUCATION/TRAINING PROGRAM

## 2021-05-14 PROCEDURE — 85730 THROMBOPLASTIN TIME PARTIAL: CPT | Performed by: STUDENT IN AN ORGANIZED HEALTH CARE EDUCATION/TRAINING PROGRAM

## 2021-05-14 PROCEDURE — 87186 SC STD MICRODIL/AGAR DIL: CPT | Performed by: STUDENT IN AN ORGANIZED HEALTH CARE EDUCATION/TRAINING PROGRAM

## 2021-05-14 PROCEDURE — 84100 ASSAY OF PHOSPHORUS: CPT | Performed by: STUDENT IN AN ORGANIZED HEALTH CARE EDUCATION/TRAINING PROGRAM

## 2021-05-14 PROCEDURE — 83935 ASSAY OF URINE OSMOLALITY: CPT | Performed by: STUDENT IN AN ORGANIZED HEALTH CARE EDUCATION/TRAINING PROGRAM

## 2021-05-14 PROCEDURE — 96360 HYDRATION IV INFUSION INIT: CPT

## 2021-05-14 PROCEDURE — 84133 ASSAY OF URINE POTASSIUM: CPT | Performed by: STUDENT IN AN ORGANIZED HEALTH CARE EDUCATION/TRAINING PROGRAM

## 2021-05-14 PROCEDURE — 87086 URINE CULTURE/COLONY COUNT: CPT | Performed by: STUDENT IN AN ORGANIZED HEALTH CARE EDUCATION/TRAINING PROGRAM

## 2021-05-14 PROCEDURE — 80053 COMPREHEN METABOLIC PANEL: CPT | Performed by: STUDENT IN AN ORGANIZED HEALTH CARE EDUCATION/TRAINING PROGRAM

## 2021-05-14 PROCEDURE — 84443 ASSAY THYROID STIM HORMONE: CPT | Performed by: STUDENT IN AN ORGANIZED HEALTH CARE EDUCATION/TRAINING PROGRAM

## 2021-05-14 RX ORDER — SODIUM CHLORIDE 9 MG/ML
125 INJECTION, SOLUTION INTRAVENOUS CONTINUOUS
Status: DISCONTINUED | OUTPATIENT
Start: 2021-05-14 | End: 2021-05-14 | Stop reason: HOSPADM

## 2021-05-14 RX ORDER — NITROFURANTOIN 25; 75 MG/1; MG/1
100 CAPSULE ORAL ONCE
Status: COMPLETED | OUTPATIENT
Start: 2021-05-14 | End: 2021-05-14

## 2021-05-14 RX ORDER — DOXYCYCLINE 100 MG/1
100 CAPSULE ORAL 2 TIMES DAILY
Qty: 14 CAPSULE | Refills: 0 | Status: SHIPPED | OUTPATIENT
Start: 2021-05-14

## 2021-05-14 RX ADMIN — NITROFURANTOIN MONOHYDRATE/MACROCRYSTALLINE 100 MG: 25; 75 CAPSULE ORAL at 20:00

## 2021-05-14 RX ADMIN — SODIUM CHLORIDE 125 ML/HR: 9 INJECTION, SOLUTION INTRAVENOUS at 19:10

## 2021-05-15 LAB — OSMOLALITY UR: 187 MOSM/KG

## 2021-05-16 LAB — BACTERIA SPEC AEROBE CULT: ABNORMAL

## 2021-08-12 ENCOUNTER — LAB (OUTPATIENT)
Dept: ONCOLOGY | Facility: CLINIC | Age: 86
End: 2021-08-12

## 2021-08-12 ENCOUNTER — OFFICE VISIT (OUTPATIENT)
Dept: ONCOLOGY | Facility: CLINIC | Age: 86
End: 2021-08-12

## 2021-08-12 VITALS
SYSTOLIC BLOOD PRESSURE: 139 MMHG | TEMPERATURE: 97.3 F | HEART RATE: 85 BPM | WEIGHT: 153 LBS | RESPIRATION RATE: 18 BRPM | DIASTOLIC BLOOD PRESSURE: 61 MMHG | OXYGEN SATURATION: 94 % | BODY MASS INDEX: 25.46 KG/M2

## 2021-08-12 DIAGNOSIS — D64.9 NORMOCYTIC ANEMIA: ICD-10-CM

## 2021-08-12 DIAGNOSIS — D50.9 IRON DEFICIENCY ANEMIA, UNSPECIFIED IRON DEFICIENCY ANEMIA TYPE: Primary | ICD-10-CM

## 2021-08-12 DIAGNOSIS — E60 ZINC DEFICIENCY: ICD-10-CM

## 2021-08-12 DIAGNOSIS — E03.9 HYPOTHYROIDISM, UNSPECIFIED TYPE: ICD-10-CM

## 2021-08-12 DIAGNOSIS — D50.9 IRON DEFICIENCY ANEMIA, UNSPECIFIED IRON DEFICIENCY ANEMIA TYPE: ICD-10-CM

## 2021-08-12 DIAGNOSIS — E87.1 HYPONATREMIA: ICD-10-CM

## 2021-08-12 DIAGNOSIS — C67.9 MALIGNANT NEOPLASM OF URINARY BLADDER, UNSPECIFIED SITE (HCC): ICD-10-CM

## 2021-08-12 LAB
ALBUMIN SERPL-MCNC: 3.68 G/DL (ref 3.5–5.2)
ALBUMIN/GLOB SERPL: 1.2 G/DL
ALP SERPL-CCNC: 93 U/L (ref 39–117)
ALT SERPL W P-5'-P-CCNC: 11 U/L (ref 1–33)
ANION GAP SERPL CALCULATED.3IONS-SCNC: 9.7 MMOL/L (ref 5–15)
AST SERPL-CCNC: 21 U/L (ref 1–32)
BASOPHILS # BLD AUTO: 0.07 10*3/MM3 (ref 0–0.2)
BASOPHILS NFR BLD AUTO: 1.1 % (ref 0–1.5)
BILIRUB SERPL-MCNC: 0.3 MG/DL (ref 0–1.2)
BUN SERPL-MCNC: 18 MG/DL (ref 8–23)
BUN/CREAT SERPL: 18.9 (ref 7–25)
CALCIUM SPEC-SCNC: 8.1 MG/DL (ref 8.6–10.5)
CHLORIDE SERPL-SCNC: 103 MMOL/L (ref 98–107)
CO2 SERPL-SCNC: 21.3 MMOL/L (ref 22–29)
CREAT SERPL-MCNC: 0.95 MG/DL (ref 0.57–1)
DEPRECATED RDW RBC AUTO: 54.1 FL (ref 37–54)
EOSINOPHIL # BLD AUTO: 0.84 10*3/MM3 (ref 0–0.4)
EOSINOPHIL NFR BLD AUTO: 12.8 % (ref 0.3–6.2)
ERYTHROCYTE [DISTWIDTH] IN BLOOD BY AUTOMATED COUNT: 14.4 % (ref 12.3–15.4)
FERRITIN SERPL-MCNC: 70.03 NG/ML (ref 13–150)
GFR SERPL CREATININE-BSD FRML MDRD: 55 ML/MIN/1.73
GLOBULIN UR ELPH-MCNC: 3.1 GM/DL
GLUCOSE SERPL-MCNC: 121 MG/DL (ref 65–99)
HCT VFR BLD AUTO: 28.9 % (ref 34–46.6)
HGB BLD-MCNC: 9.2 G/DL (ref 12–15.9)
IMM GRANULOCYTES # BLD AUTO: 0.01 10*3/MM3 (ref 0–0.05)
IMM GRANULOCYTES NFR BLD AUTO: 0.2 % (ref 0–0.5)
IRON 24H UR-MRATE: 75 MCG/DL (ref 37–145)
IRON SATN MFR SERPL: 20 % (ref 20–50)
LYMPHOCYTES # BLD AUTO: 2.4 10*3/MM3 (ref 0.7–3.1)
LYMPHOCYTES NFR BLD AUTO: 36.6 % (ref 19.6–45.3)
MCH RBC QN AUTO: 32.7 PG (ref 26.6–33)
MCHC RBC AUTO-ENTMCNC: 31.8 G/DL (ref 31.5–35.7)
MCV RBC AUTO: 102.8 FL (ref 79–97)
MONOCYTES # BLD AUTO: 0.61 10*3/MM3 (ref 0.1–0.9)
MONOCYTES NFR BLD AUTO: 9.3 % (ref 5–12)
NEUTROPHILS NFR BLD AUTO: 2.63 10*3/MM3 (ref 1.7–7)
NEUTROPHILS NFR BLD AUTO: 40 % (ref 42.7–76)
NRBC BLD AUTO-RTO: 0 /100 WBC (ref 0–0.2)
PLATELET # BLD AUTO: 208 10*3/MM3 (ref 140–450)
PMV BLD AUTO: 9.1 FL (ref 6–12)
POTASSIUM SERPL-SCNC: 4.9 MMOL/L (ref 3.5–5.2)
PROT SERPL-MCNC: 6.8 G/DL (ref 6–8.5)
RBC # BLD AUTO: 2.81 10*6/MM3 (ref 3.77–5.28)
SODIUM SERPL-SCNC: 134 MMOL/L (ref 136–145)
TIBC SERPL-MCNC: 373 MCG/DL (ref 298–536)
TRANSFERRIN SERPL-MCNC: 250 MG/DL (ref 200–360)
WBC # BLD AUTO: 6.56 10*3/MM3 (ref 3.4–10.8)

## 2021-08-12 PROCEDURE — 99214 OFFICE O/P EST MOD 30 MIN: CPT | Performed by: NURSE PRACTITIONER

## 2021-08-12 PROCEDURE — 85025 COMPLETE CBC W/AUTO DIFF WBC: CPT | Performed by: NURSE PRACTITIONER

## 2021-08-12 PROCEDURE — 82728 ASSAY OF FERRITIN: CPT | Performed by: NURSE PRACTITIONER

## 2021-08-12 PROCEDURE — 84630 ASSAY OF ZINC: CPT | Performed by: NURSE PRACTITIONER

## 2021-08-12 PROCEDURE — 83540 ASSAY OF IRON: CPT | Performed by: NURSE PRACTITIONER

## 2021-08-12 PROCEDURE — 80053 COMPREHEN METABOLIC PANEL: CPT | Performed by: NURSE PRACTITIONER

## 2021-08-12 PROCEDURE — 84466 ASSAY OF TRANSFERRIN: CPT | Performed by: NURSE PRACTITIONER

## 2021-08-12 RX ORDER — LEVOCETIRIZINE DIHYDROCHLORIDE 5 MG/1
5 TABLET, FILM COATED ORAL EVERY EVENING
COMMUNITY

## 2021-08-12 NOTE — PROGRESS NOTES
Kat Kenney  [id number]  1/3/1932  8/12/2021    Referring Provider:   Dr. Evelyn Douglas    Reason for Followup:   Normocytic Anemia    Chief Complaint:  Follow up of Normocytic Anemia    History of Present Illness:  Kat Kenney is a very pleasant 89 y.o.  female who presents in follow up appointment for further management and evaluation of normocytic anemia.     She was found to have anemia while residing in the nursing home for rehabilitation after knee replacement at the end of 2016. At the time of her initial consultation she had left [facility] and was then living at home and given her persistent anemia she was referred to our clinic for further evaluation. She was previously placed on oral iron for a history of iron deficiency however was unable to tolerate the medication due to intolerance and is now currently taking a multivitamin containing iron. She had previously followed with Dr. Morris for possible underlying malignancy but reports that workup was negative (including a bone marrow biopsy) and she had not followed with him in for two years prior to her initial consultation with me with the exception in that she followed with him October 2016 where she received 2 units of packed red blood cells for her anemia. She has not required further blood transfusions since that time. She did also have a bone marrow in 2006 which did reveal a mildly hypercellular marrow for her age with trilineage hematopoiesis. She also has been following with Dr. Kenney for hematuria and frequent urinary tract infections and underwent a cystoscopy as well as TURBT and pathology was significant for bladder cancer with no detrusor invasion. Chemotherapy or radiation was not recommended and she follows with Dr. Kenney every 3 months and recently underwent imaging that was negative. She states that she was evaluated by Dr. Kenney last summer however we contacted their office to obtain records and she had not been seen since  2018.     She has been having unintentional weight loss since 2019 and more so since earlier this year (2020). She states that at first it was felt to be due to decreased oral intake due to ill fitting dentures and later felt to be due to her thyroid medication. However she has continued to lose weight despite adjustments. She reports that she has had imaging of her chest at [facility] with the last in Feb 2020 for chronic cough without any underlying cause seen. Her primary provider is also making a referral to ENT. She has also followed with Dr. Cunningham in gastroenterology and had an EGD/colonoscopy without active bleeding and significant for a hiatal hernia. She also continues to follow with Dr. Luz at the [facility] for polyarthritis for which she is currently taking methotrexate and folic acid. She otherwise denies of any fevers, night sweats, lymphadenopathy.    Interim History:  Ms. Kenney presents today for follow up, accompanied by her caregiver. Since her last visit, she reports she has been doing overall well. She was following with ortho for her broken right foot and says she is now following with podiatry. Her foot remains in a brace. She says she has been following closely with her PCP for management of hyponatremia which has been improving with water restriction. At present, she complains of chronic fatigue and weakness which is stable/improved. She denies any new complaints today.       Allergies   Allergen Reactions   • Benadryl [Diphenhydramine Hcl (Sleep)]    • Levaquin [Levofloxacin]    • Penicillins    • Zocor [Simvastatin] Unknown (See Comments)     cardioligist took her off of this and said she was allergic       Past Medical History:   Diagnosis Date   • Anemia    • Asthma    • Disease of thyroid gland    • GERD (gastroesophageal reflux disease)    • Hypertension    Coronary artery disease s/p stent placement   Sick sinus  syndrome  Hypothryoidism  Hyperlipidemia  Inflammatory arthritis  Bladder Cancer completely excised     Past Surgical History:   Procedure Laterality Date   • BLADDER TUMOR EXCISION  07/30/2018   • BREAST BIOPSY Left    • HYSTERECTOMY     • REPLACEMENT TOTAL KNEE     • SINUS SURGERY     Pacemaker        Social History     Socioeconomic History   • Marital status:      Spouse name: Not on file   • Number of children: Not on file   • Years of education: Not on file   • Highest education level: Not on file   Tobacco Use   • Smoking status: Never Smoker   • Smokeless tobacco: Never Used   Vaping Use   • Vaping Use: Never used   Substance and Sexual Activity   • Alcohol use: No   • Drug use: No   • Sexual activity: Defer   She lives by herself.    Family History   Problem Relation Age of Onset   • Breast cancer Sister    • Cancer Brother          Current Outpatient Medications:   •  Albuterol Sulfate (PROAIR HFA IN), Inhale 2 puffs 2 (two) times a day., Disp: , Rfl:   •  aspirin 81 MG EC tablet, Take 81 mg by mouth daily., Disp: , Rfl:   •  atenolol (TENORMIN) 25 MG tablet, Take 25 mg by mouth Daily., Disp: , Rfl:   •  atorvastatin (LIPITOR) 20 MG tablet, Take 20 mg by mouth daily., Disp: , Rfl:   •  azelastine (ASTELIN) 0.1 % nasal spray, 2 sprays into the nostril(s) as directed by provider 2 (Two) Times a Day. Use in each nostril as directed, Disp: , Rfl:   •  conjugated estrogens (PREMARIN) 0.625 MG/GM vaginal cream, Insert 0.5 g into the vagina daily., Disp: , Rfl:   •  doxycycline (MONODOX) 100 MG capsule, Take 1 capsule by mouth 2 (Two) Times a Day., Disp: 14 capsule, Rfl: 0  •  Fluticasone Furoate-Vilanterol (BREO ELLIPTA IN), Inhale., Disp: , Rfl:   •  folic acid (FOLVITE) 1 MG tablet, Take 1 mg by mouth daily., Disp: , Rfl:   •  levocetirizine (XYZAL) 5 MG tablet, Take 5 mg by mouth Every Evening., Disp: , Rfl:   •  levothyroxine (SYNTHROID, LEVOTHROID) 100 MCG tablet, Take 100 mcg by mouth daily., Disp:  , Rfl:   •  losartan (COZAAR) 100 MG tablet, Take 100 mg by mouth Daily., Disp: , Rfl:   •  methotrexate 2.5 MG tablet, Take 2.5 mg by mouth 1 (one) time per week., Disp: , Rfl:   •  O2 (OXYGEN), Inhale 2 L/min every night., Disp: , Rfl:   •  omeprazole (PriLOSEC) 40 MG capsule, Take 40 mg by mouth daily., Disp: , Rfl:   •  oxybutynin XL (DITROPAN-XL) 5 MG 24 hr tablet, Take 5 mg by mouth 2 (Two) Times a Day., Disp: , Rfl:   •  Ped Multivitamins-Fl-Iron (MULTIVITAMIN WITH FLUORIDE/IRON) 0.25-10 MG/ML solution solution, Take  by mouth Daily., Disp: , Rfl:   •  Probiotic Product (PROBIOTIC DAILY PO), Take 1 tablet by mouth daily., Disp: , Rfl:   •  rOPINIRole (REQUIP) 0.25 MG tablet, Take 0.25 mg by mouth Every Night. Take 1 hour before bedtime., Disp: , Rfl:   •  sodium chloride (OCEAN) 0.65 % nasal spray, 1 spray into each nostril as needed for congestion., Disp: , Rfl:     Review of Systems:  Pertinent positives are listed as per history of present of illness, all other systems reviewed and are negative.     Physical Exam:  Vital Signs: These were reviewed and listed as per patient’s electronic medical chart  Vitals:    08/12/21 1355   BP: 139/61   Pulse: 85   Resp: 18   Temp: 97.3 °F (36.3 °C)   SpO2: 94%     General: Awake, alert and oriented, in no distress. Using rolater walker for assistance.   HEENT: Head is atraumatic, normocephalic, extraocular movements full, no scleral icterus  Neck: supple, no jvd, lymphadenopathy or masses  Cardiovascular: RRR without murmurs, rubs or gallops  Pulmonary: non-labored, clear to auscultation bilaterally  Abdomen: soft, non-tender, non-distended, normal active bowel sounds present  Extremities: No clubbing, cyanosis or edema  Neurologic: Mental status as above, grossly non-focal exam      Labs / Studies:  LABS:  WBC   Date Value Ref Range Status   08/12/2021 6.56 3.40 - 10.80 10*3/mm3 Final     RBC   Date Value Ref Range Status   08/12/2021 2.81 (L) 3.77 - 5.28 10*6/mm3  Final     Hemoglobin   Date Value Ref Range Status   08/12/2021 9.2 (L) 12.0 - 15.9 g/dL Final     Hematocrit   Date Value Ref Range Status   08/12/2021 28.9 (L) 34.0 - 46.6 % Final     MCV   Date Value Ref Range Status   08/12/2021 102.8 (H) 79.0 - 97.0 fL Final     MCH   Date Value Ref Range Status   08/12/2021 32.7 26.6 - 33.0 pg Final     MCHC   Date Value Ref Range Status   08/12/2021 31.8 31.5 - 35.7 g/dL Final     RDW   Date Value Ref Range Status   08/12/2021 14.4 12.3 - 15.4 % Final     RDW-SD   Date Value Ref Range Status   08/12/2021 54.1 (H) 37.0 - 54.0 fl Final     MPV   Date Value Ref Range Status   08/12/2021 9.1 6.0 - 12.0 fL Final     Platelets   Date Value Ref Range Status   08/12/2021 208 140 - 450 10*3/mm3 Final     Neutrophil %   Date Value Ref Range Status   08/12/2021 40.0 (L) 42.7 - 76.0 % Final     Lymphocyte %   Date Value Ref Range Status   08/12/2021 36.6 19.6 - 45.3 % Final     Monocyte %   Date Value Ref Range Status   08/12/2021 9.3 5.0 - 12.0 % Final     Eosinophil %   Date Value Ref Range Status   08/12/2021 12.8 (H) 0.3 - 6.2 % Final     Basophil %   Date Value Ref Range Status   08/12/2021 1.1 0.0 - 1.5 % Final     Immature Grans %   Date Value Ref Range Status   08/12/2021 0.2 0.0 - 0.5 % Final     Neutrophils, Absolute   Date Value Ref Range Status   08/12/2021 2.63 1.70 - 7.00 10*3/mm3 Final     Lymphocytes, Absolute   Date Value Ref Range Status   08/12/2021 2.40 0.70 - 3.10 10*3/mm3 Final     Monocytes, Absolute   Date Value Ref Range Status   08/12/2021 0.61 0.10 - 0.90 10*3/mm3 Final     Eosinophils, Absolute   Date Value Ref Range Status   08/12/2021 0.84 (H) 0.00 - 0.40 10*3/mm3 Final     Basophils, Absolute   Date Value Ref Range Status   08/12/2021 0.07 0.00 - 0.20 10*3/mm3 Final     Immature Grans, Absolute   Date Value Ref Range Status   08/12/2021 0.01 0.00 - 0.05 10*3/mm3 Final     nRBC   Date Value Ref Range Status   08/12/2021 0.0 0.0 - 0.2 /100 WBC Final     Lab  Results   Component Value Date    GLUCOSE 121 (H) 2021    BUN 18 2021    CREATININE 0.95 2021    EGFRIFNONA 55 (L) 2021    EGFRIFAFRI  09/15/2016      Comment:      <15 Indicative of kidney failure.    BCR 18.9 2021    K 4.9 2021    CO2 21.3 (L) 2021    CALCIUM 8.1 (L) 2021    PROTENTOTREF 6.8 2021    ALBUMIN 3.68 2021    LABIL2 1.1 2021    AST 21 2021    ALT 11 2021     Lab Results   Component Value Date    FERRITIN 70.03 2021    IRON 75 2021    TIBC 373 2021    LABIRON 20 2021    BKBEKKHD28 1,271 (H) 2021    FOLATE >20.00 2021    HAPTOGLOBIN 196 2021    RETICCTPCT 2.01 (H) 2021    RETIC 0.0597 2021       ENDOSCOPIC REPORTS:  2019:              IMAGIN/17/20:  CT CHEST WITH CONTRAST: Contrast was injected intravenously and spiral scans were obtained from the apices of the lung and extended to the diaphragm. Both lung and soft tissue windows were archived. On the lung windows, the lungs are generally hyperinflated. There are granulomas seen scattered throughout both lungs. There are calcified lymph nodes in the mediastinum and hilar areas. There is some minimal atelectasis in the left lung base. No pulmonary parenchymal lung nodules to suggest metastatic disease were demonstrated. On the mediastinal windows, there were no enlarged lymph nodes that were not calcified in the mediastinum or hilar areas. There is a large hiatal hernia of the stomach measuring approximately 9 cm in diameter. IMPRESSION: No CT findings to suggest metastatic disease in the chest. There are multiple granulomas in the lungs and calcifications in the mediastinal lymph nodes. There is a large hiatal hernia of the stomach.     CT ABDOMEN PELVIS W CONTRAST: Images obtained through the lower chest show a large hiatal hernia of the stomach in the lower mediastinum. The hernia has  a diameter of approximately 9 cm. There are multiple granulomas noted in the spleen. The spleen was not enlarged. The liver showed no evidence of focal abnormality. There is no thickening of the gallbladder wall or dilatation of the bile ducts in the liver. The pancreas showed no evidence of mass or inflammation. The kidneys showed no evidence of obstruction. No focal renal parenchymal abnormalities were demonstrated. The aorta is normal in caliber. No enlarged lymph nodes were noted. The bowel shows no evidence of obstruction. There is a moderate amount of gas and fecal material throughout the colon suggesting constipation. There is no ascites in the abdomen. In the pelvis, the urinary bladder is moderately distended with urine. There is no thickening of the bladder wall. No masses or fluid collections were seen in the pelvis. IMPRESSION: No CT findings of metastatic disease. Urinary bladder is moderately distended with urine but showed no focal bladder wall abnormalities. There is a large amount of gas and stool in the colon suggesting constipation. There is a very large hiatal hernia of the stomach in the lower mediastinum.          PATHOLOGY:  09/02/16: Peripheral Smear: Normocytic anemia with mild anisopoikilocytosis. Normal white blood cell count and morphology, adequate platelets.  02/09/17: Peripheral Smear:          12/19/16: Flow cytometry:         03/29/17: Flow Cytometry:        06/21/18: Flow Cytometry:      Assessment/Plan :  Kat Kenney is a very pleasant 89 y.o.  female who presents in follow up appointment for further management and evaluation of normocytic anemia.      Normocytic Anemia  - This is likely multifactorial and secondary to iron deficiency versus anemia of chronic inflammation from her arthritis versus medications (MTX) versus blood loss from previous hematuria (now resolved) which are all likely contributing.   - A complete blood count continued to show ongoing anemia however  this slowly improved since resolution of hematuria. She did receive 2 units of packed red blood cells at Dr. Márquez office on 10/2016. Her serum iron and iron saturation were low with a ferritin of 44 during that visit and she likely has some component of iron deficiency along with anemia of chronic inflammation.   - Today her iron panel continues to be within normal limits without supplementation (she is only on a MVI containing iron, she previously took oral iron and unable to tolerate).   - B12 and folate have also been normal.   - She had a negative GREY and did not show signs of hemolysis. ESR was elevated which is likely secondary to her underlying inflammatory arthritis which can also cause anemia of chronic inflammation and hence bone marrow suppression. Acute hepatitis panel non reactive.  - Given thus far negative workup, SPEP showed no evidence of Mspike and there was no abnormal immunofixation. Her serum free light chains were both elevated with a normal ratio and can be elevated in the setting of underlying inflammation.   - Possible that her inflammatory arthritis is causing inflammation which is suppressing her bone marrow and thus contributing to her anemia. Also obtained a rheumatoid factor and SUDEEP which were both negative. She continues to follow with Rheumatology.  - Obtained a flow cytometry which was significant for increased atypical NK cells and was unsure of its significance increase in eosinophils and could be secondary to a reactive underlying process such as her inflammatory arthritis vs allergies vs neoplastic. We have discussed with her the possibility of underlying bone marrow process that would require bone marrow biopsy to further evaluate. However, given stable counts at present she would like to continue to monitor and does not want to pursue bone marrrow.    - On repeat flow cytometry NK cells were no longer noted however she continued to have an elevated eosinophil level -  suspected this was likely secondary to her severe underlying allergies versus bladder cancer. She was treated empirically for an underlying parasitic infection with Ivermectin. Her eosinophils later normalized and are again elevated, and we have repeatedly discussed bone marrow biopsy and at present she is not interested.  - Repeat CBC from today shows Hg 9.2 which remains stable with normal WBC and platelets. Will monitor.     Severe Hyponatremia  - She has been following closely with PCP and restricting water intake with improvement. Na is 134 today. Ongoing management per PCP.     Hypothyroidism:  -Currently on Synthroid 88 mcg PO daily. Ongoing management per PCP.     Bladder Malignancy  - Patient reports this was completely excised without muscular involvement and adjuvant therapy with chemotherapy or radiation was not recommended.  - She follows with Dr. Kenney every 3 months in [place], [place] with [facility] with repeat imaging. We have been unable to get records since 2018. She says she is doing well in the regard based on recent follow up.     Iron deficiency  Zinc deficiency  - She was previously on iron supplementation however was unable to tolerate.   - Copper normal, however zinc was low. She is currently on iron containing MVI and previously discontinued her zinc supplementation. She continues to be on folic acid as she is currently on methotrexate. Iron panel/ferritin from today were normal. Will continue with MVI.  Zinc is pending.     ACO / JAXSON/Other  Quality measures  -  Kat Kenney received 2020 flu vaccine.  -  Kat Kenney reports a pain score of 0.    -  Current outpatient and discharge medications have been reconciled for the patient.  Reviewed by: ANTONIO Rosa    I spent 30  minutes with Kat Kenney today.  In the office today, more than 50% of this time was spent face-to-face with her  in counseling / coordination of care, reviewing her interim medical  history and counseling on the current treatment plan.  All questions were answered to her satisfaction.         Luann Bhakta, ANTONIO  08/12/21  14:07 EDT

## 2021-08-17 LAB — ZINC SERPL-MCNC: 54 UG/DL (ref 44–115)

## 2021-11-11 ENCOUNTER — OFFICE VISIT (OUTPATIENT)
Dept: ONCOLOGY | Facility: CLINIC | Age: 86
End: 2021-11-11

## 2021-11-11 VITALS
OXYGEN SATURATION: 95 % | WEIGHT: 146.8 LBS | BODY MASS INDEX: 24.46 KG/M2 | HEIGHT: 65 IN | HEART RATE: 93 BPM | RESPIRATION RATE: 18 BRPM | SYSTOLIC BLOOD PRESSURE: 165 MMHG | TEMPERATURE: 97.7 F | DIASTOLIC BLOOD PRESSURE: 73 MMHG

## 2021-11-11 DIAGNOSIS — D50.9 IRON DEFICIENCY ANEMIA, UNSPECIFIED IRON DEFICIENCY ANEMIA TYPE: Primary | ICD-10-CM

## 2021-11-11 DIAGNOSIS — E60 ZINC DEFICIENCY: ICD-10-CM

## 2021-11-11 DIAGNOSIS — D64.9 NORMOCYTIC ANEMIA: ICD-10-CM

## 2021-11-11 LAB
BASOPHILS # BLD AUTO: 0.07 10*3/MM3 (ref 0–0.2)
BASOPHILS NFR BLD AUTO: 1.3 % (ref 0–1.5)
DEPRECATED RDW RBC AUTO: 47.8 FL (ref 37–54)
EOSINOPHIL # BLD AUTO: 0.69 10*3/MM3 (ref 0–0.4)
EOSINOPHIL NFR BLD AUTO: 12.3 % (ref 0.3–6.2)
ERYTHROCYTE [DISTWIDTH] IN BLOOD BY AUTOMATED COUNT: 13.9 % (ref 12.3–15.4)
FERRITIN SERPL-MCNC: 219.3 NG/ML (ref 13–150)
HCT VFR BLD AUTO: 28.3 % (ref 34–46.6)
HGB BLD-MCNC: 9.4 G/DL (ref 12–15.9)
IMM GRANULOCYTES # BLD AUTO: 0.01 10*3/MM3 (ref 0–0.05)
IMM GRANULOCYTES NFR BLD AUTO: 0.2 % (ref 0–0.5)
IRON 24H UR-MRATE: 56 MCG/DL (ref 37–145)
IRON SATN MFR SERPL: 16 % (ref 20–50)
LYMPHOCYTES # BLD AUTO: 2.33 10*3/MM3 (ref 0.7–3.1)
LYMPHOCYTES NFR BLD AUTO: 41.6 % (ref 19.6–45.3)
MCH RBC QN AUTO: 31.9 PG (ref 26.6–33)
MCHC RBC AUTO-ENTMCNC: 33.2 G/DL (ref 31.5–35.7)
MCV RBC AUTO: 95.9 FL (ref 79–97)
MONOCYTES # BLD AUTO: 0.52 10*3/MM3 (ref 0.1–0.9)
MONOCYTES NFR BLD AUTO: 9.3 % (ref 5–12)
NEUTROPHILS NFR BLD AUTO: 1.98 10*3/MM3 (ref 1.7–7)
NEUTROPHILS NFR BLD AUTO: 35.3 % (ref 42.7–76)
NRBC BLD AUTO-RTO: 0 /100 WBC (ref 0–0.2)
PLATELET # BLD AUTO: 226 10*3/MM3 (ref 140–450)
PMV BLD AUTO: 8.9 FL (ref 6–12)
RBC # BLD AUTO: 2.95 10*6/MM3 (ref 3.77–5.28)
TIBC SERPL-MCNC: 341 MCG/DL (ref 298–536)
TRANSFERRIN SERPL-MCNC: 229 MG/DL (ref 200–360)
WBC # BLD AUTO: 5.6 10*3/MM3 (ref 3.4–10.8)

## 2021-11-11 PROCEDURE — 82607 VITAMIN B-12: CPT | Performed by: INTERNAL MEDICINE

## 2021-11-11 PROCEDURE — 84466 ASSAY OF TRANSFERRIN: CPT | Performed by: INTERNAL MEDICINE

## 2021-11-11 PROCEDURE — 85025 COMPLETE CBC W/AUTO DIFF WBC: CPT | Performed by: INTERNAL MEDICINE

## 2021-11-11 PROCEDURE — 83540 ASSAY OF IRON: CPT | Performed by: INTERNAL MEDICINE

## 2021-11-11 PROCEDURE — 82746 ASSAY OF FOLIC ACID SERUM: CPT | Performed by: INTERNAL MEDICINE

## 2021-11-11 PROCEDURE — 99214 OFFICE O/P EST MOD 30 MIN: CPT | Performed by: INTERNAL MEDICINE

## 2021-11-11 PROCEDURE — 84630 ASSAY OF ZINC: CPT | Performed by: INTERNAL MEDICINE

## 2021-11-11 PROCEDURE — 82728 ASSAY OF FERRITIN: CPT | Performed by: INTERNAL MEDICINE

## 2021-11-11 RX ORDER — ESOMEPRAZOLE MAGNESIUM 40 MG/1
40 CAPSULE, DELAYED RELEASE ORAL
COMMUNITY

## 2021-11-11 RX ORDER — FAMOTIDINE 20 MG/1
20 TABLET, FILM COATED ORAL 2 TIMES DAILY
COMMUNITY

## 2021-11-11 RX ORDER — BENZONATATE 200 MG/1
200 CAPSULE ORAL 3 TIMES DAILY PRN
COMMUNITY

## 2021-11-11 RX ORDER — HYDROCHLOROTHIAZIDE 12.5 MG/1
12.5 CAPSULE, GELATIN COATED ORAL DAILY
COMMUNITY

## 2021-11-11 NOTE — PROGRESS NOTES
Kat Kenney  [id number]  1/3/1932  11/11/2021      Referring Provider:   Dr. Evelyn Douglas    Reason for Followup:   Normocytic Anemia    Chief Complaint:  Normocytic Anemia      History of Present Illness:  Kat Kenney is a very pleasant 89 y.o.  female who presents in follow up appointment for further management and evaluation of normocytic anemia.     She was found to have anemia while residing in the nursing home for rehabilitation after knee replacement at the end of 2016. At the time of her initial consultation she had left [facility] and was then living at home and given her persistent anemia she was referred to our clinic for further evaluation. She was previously placed on oral iron for a history of iron deficiency however was unable to tolerate the medication due to intolerance and is now currently taking a multivitamin containing iron. She had previously followed with Dr. Morris for possible underlying malignancy but reports that workup was negative (including a bone marrow biopsy) and she had not followed with him in for two years prior to her initial consultation with me with the exception in that she followed with him October 2016 where she received 2 units of packed red blood cells for her anemia. She has not required further blood transfusions since that time. She did also have a bone marrow in 2006 which did reveal a mildly hypercellular marrow for her age with trilineage hematopoiesis. She also has been following with Dr. Kenney for hematuria and frequent urinary tract infections and underwent a cystoscopy as well as TURBT and pathology was significant for bladder cancer with no detrusor invasion. Chemotherapy or radiation was not recommended and she follows with Dr. Kenney every 3 months and recently underwent imaging that was negative. She states that she was evaluated by Dr. Kenney last summer however we contacted their office to obtain records and she had not been seen since 2018.      She has been having unintentional weight loss since 2019 and more so since earlier this year (2020). She states that at first it was felt to be due to decreased oral intake due to ill fitting dentures and later felt to be due to her thyroid medication. However she has continued to lose weight despite adjustments. She reports that she has had imaging of her chest at [facility] with the last in Feb 2020 for chronic cough without any underlying cause seen. Her primary provider is also making a referral to ENT. She has also followed with Dr. Cunningham in gastroenterology and had an EGD/colonoscopy without active bleeding and significant for a hiatal hernia. She also continues to follow with Dr. Luz at the [facility] for polyarthritis for which she is currently taking methotrexate and folic acid. She otherwise denies of any fevers, night sweats, lymphadenopathy.    Interim History:  During her last visit she had recently broke her foot however today she is doing better and currently mobilizing on her own with a rolling walker. She denies of any bleeding and reports that she was evaluated by Dr. Kenney (with [facility]) July 2021 and reports that everything was felt to be fine and could return in one year. She was recently treated for bronchitis with steroids, antibiotics, and an inhaler and reports improvement in her symptoms.           The following portions of the patient's history were reviewed and updated as appropriate: allergies, current medications, past family history, past medical history, past social history, past surgical history and problem list.    Allergies   Allergen Reactions   • Benadryl [Diphenhydramine Hcl (Sleep)]    • Levaquin [Levofloxacin]    • Penicillins    • Zocor [Simvastatin] Unknown (See Comments)     cardioligist took her off of this and said she was allergic       Past Medical History:   Diagnosis Date   • Anemia    • Asthma    • Disease of thyroid gland     • GERD (gastroesophageal reflux disease)    • Hypertension    Coronary artery disease s/p stent placement   Sick sinus syndrome  Hypothryoidism  Hyperlipidemia  Inflammatory arthritis  Bladder Cancer completely excised         Past Surgical History:   Procedure Laterality Date   • BLADDER TUMOR EXCISION  07/30/2018   • BREAST BIOPSY Left    • HYSTERECTOMY     • REPLACEMENT TOTAL KNEE     • SINUS SURGERY     Pacemaker        Social History     Socioeconomic History   • Marital status:    Tobacco Use   • Smoking status: Never Smoker   • Smokeless tobacco: Never Used   Vaping Use   • Vaping Use: Never used   Substance and Sexual Activity   • Alcohol use: No   • Drug use: No   • Sexual activity: Defer   She lives by herself.    Family History   Problem Relation Age of Onset   • Breast cancer Sister    • Cancer Brother          Current Outpatient Medications:   •  Albuterol Sulfate (PROAIR HFA IN), Inhale 2 puffs 2 (two) times a day., Disp: , Rfl:   •  aspirin 81 MG EC tablet, Take 81 mg by mouth daily., Disp: , Rfl:   •  atenolol (TENORMIN) 25 MG tablet, Take 25 mg by mouth Daily., Disp: , Rfl:   •  atorvastatin (LIPITOR) 20 MG tablet, Take 20 mg by mouth daily., Disp: , Rfl:   •  azelastine (ASTELIN) 0.1 % nasal spray, 2 sprays into the nostril(s) as directed by provider 2 (Two) Times a Day. Use in each nostril as directed, Disp: , Rfl:   •  benzonatate (TESSALON) 200 MG capsule, Take 200 mg by mouth 3 (Three) Times a Day As Needed for Cough., Disp: , Rfl:   •  conjugated estrogens (PREMARIN) 0.625 MG/GM vaginal cream, Insert 0.5 g into the vagina daily., Disp: , Rfl:   •  esomeprazole (nexIUM) 40 MG capsule, Take 40 mg by mouth Every Morning Before Breakfast., Disp: , Rfl:   •  famotidine (PEPCID) 20 MG tablet, Take 20 mg by mouth 2 (Two) Times a Day., Disp: , Rfl:   •  Fluticasone Furoate-Vilanterol (BREO ELLIPTA IN), Inhale., Disp: , Rfl:   •  folic acid (FOLVITE) 1 MG tablet, Take 1 mg by mouth daily.,  Disp: , Rfl:   •  hydroCHLOROthiazide (MICROZIDE) 12.5 MG capsule, Take 12.5 mg by mouth Daily., Disp: , Rfl:   •  levocetirizine (XYZAL) 5 MG tablet, Take 5 mg by mouth Every Evening., Disp: , Rfl:   •  levothyroxine (SYNTHROID, LEVOTHROID) 100 MCG tablet, Take 100 mcg by mouth daily., Disp: , Rfl:   •  losartan (COZAAR) 100 MG tablet, Take 100 mg by mouth Daily., Disp: , Rfl:   •  methotrexate 2.5 MG tablet, Take 2.5 mg by mouth 1 (one) time per week., Disp: , Rfl:   •  O2 (OXYGEN), Inhale 2 L/min every night., Disp: , Rfl:   •  Ped Multivitamins-Fl-Iron (MULTIVITAMIN WITH FLUORIDE/IRON) 0.25-10 MG/ML solution solution, Take  by mouth Daily., Disp: , Rfl:   •  Probiotic Product (PROBIOTIC DAILY PO), Take 1 tablet by mouth daily., Disp: , Rfl:   •  rOPINIRole (REQUIP) 0.25 MG tablet, Take 0.25 mg by mouth Every Night. Take 1 hour before bedtime., Disp: , Rfl:   •  sodium chloride (OCEAN) 0.65 % nasal spray, 1 spray into each nostril as needed for congestion., Disp: , Rfl:   •  doxycycline (MONODOX) 100 MG capsule, Take 1 capsule by mouth 2 (Two) Times a Day., Disp: 14 capsule, Rfl: 0  •  omeprazole (PriLOSEC) 40 MG capsule, Take 40 mg by mouth daily., Disp: , Rfl:   •  oxybutynin XL (DITROPAN-XL) 5 MG 24 hr tablet, Take 5 mg by mouth 2 (Two) Times a Day., Disp: , Rfl:         Review of Systems:  A comprehensive 14 point review of systems was conducted with patient and positive as per HPI otherwise negative.        Physical Exam:  Vital Signs: These were reviewed and listed as per patient’s electronic medical chart  Vitals:    11/11/21 1237   BP: 165/73   Pulse: 93   Resp: 18   Temp: 97.7 °F (36.5 °C)   SpO2: 95%     General: Awake, alert and oriented, in no distress  HEENT: Head is atraumatic, normocephalic, extraocular movements full, no scleral icterus  Neck: supple, no jvd, lymphadenopathy or masses  Cardiovascular: regular rate and rhythm without murmurs, rubs or gallops  Pulmonary: non-labored, clear to  auscultation bilaterally, no wheezing  Abdomen: soft, non-tender, non-distended, normal active bowel sounds present  Extremities: No clubbing, cyanosis or edema  Lymph: No cervical, supraclavicular adenopathy  Neurologic: Mental status as above, alert, awake and oriented, grossly non-focal exam  Skin: warm, dry, intact, chronic skin lesions  MSK: mobilizing with rolling walker            Labs / Studies:  LABS:            ENDOSCOPIC REPORTS:  2019:              IMAGIN/17/20:  CT CHEST WITH CONTRAST: Contrast was injected intravenously and spiral scans were obtained from the apices of the lung and extended to the diaphragm. Both lung and soft tissue windows were archived. On the lung windows, the lungs are generally hyperinflated. There are granulomas seen scattered throughout both lungs. There are calcified lymph nodes in the mediastinum and hilar areas. There is some minimal atelectasis in the left lung base. No pulmonary parenchymal lung nodules to suggest metastatic disease were demonstrated. On the mediastinal windows, there were no enlarged lymph nodes that were not calcified in the mediastinum or hilar areas. There is a large hiatal hernia of the stomach measuring approximately 9 cm in diameter. IMPRESSION: No CT findings to suggest metastatic disease in the chest. There are multiple granulomas in the lungs and calcifications in the mediastinal lymph nodes. There is a large hiatal hernia of the stomach.     CT ABDOMEN PELVIS W CONTRAST: Images obtained through the lower chest show a large hiatal hernia of the stomach in the lower mediastinum. The hernia has a diameter of approximately 9 cm. There are multiple granulomas noted in the spleen. The spleen was not enlarged. The liver showed no evidence of focal abnormality. There is no thickening of the gallbladder wall or dilatation of the bile ducts in the liver. The pancreas showed no evidence of mass or inflammation. The  kidneys showed no evidence of obstruction. No focal renal parenchymal abnormalities were demonstrated. The aorta is normal in caliber. No enlarged lymph nodes were noted. The bowel shows no evidence of obstruction. There is a moderate amount of gas and fecal material throughout the colon suggesting constipation. There is no ascites in the abdomen. In the pelvis, the urinary bladder is moderately distended with urine. There is no thickening of the bladder wall. No masses or fluid collections were seen in the pelvis. IMPRESSION: No CT findings of metastatic disease. Urinary bladder is moderately distended with urine but showed no focal bladder wall abnormalities. There is a large amount of gas and stool in the colon suggesting constipation. There is a very large hiatal hernia of the stomach in the lower mediastinum.          PATHOLOGY:  09/02/16: Peripheral Smear: Normocytic anemia with mild anisopoikilocytosis. Normal white blood cell count and morphology, adequate platelets.  02/09/17: Peripheral Smear:          12/19/16: Flow cytometry:         03/29/17: Flow Cytometry:        06/21/18: Flow Cytometry:            Assessment/Plan :  Kat Kenney is a very pleasant 89 y.o.  female who presents in follow up appointment for further management and evaluation of normocytic anemia.      Normocytic Anemia  - This is likely multifactorial and secondary to iron deficiency versus anemia of chronic inflammation from her arthritis versus medications (MTX) versus blood loss from previous hematuria (now resolved) which are all likely contributing.   - A complete blood count continued to show ongoing anemia however this slowly improved since resolution of hematuria. She did receive 2 units of packed red blood cells at Dr. Márquez office on 10/2016. Her serum iron and iron saturation were low with a ferritin of 44 during that visit and she likely has some component of iron deficiency along with anemia of chronic  inflammation.   - Today her iron panel continues to be within normal limits without supplementation (she is only on a MVI containing iron, she previously took oral iron and unable to tolerate).   - B12 and folate have also been normal.   - She had a negative GREY and did not show signs of hemolysis. ESR was elevated which is likely secondary to her underlying inflammatory arthritis which can also cause anemia of chronic inflammation and hence bone marrow suppression. Acute hepatitis panel non reactive.  - Given thus far negative workup, SPEP showed no evidence of Mspike and there was no abnormal immunofixation. Her serum free light chains were both elevated with a normal ratio and can be elevated in the setting of underlying inflammation.   - Possible that her inflammatory arthritis is causing inflammation which is suppressing her bone marrow and thus contributing to her anemia. Also obtained a rheumatoid factor and SUDEEP which were both negative. She continues to follow with Rheumatology.  - Obtained a flow cytometry which was significant for increased atypical NK cells and was unsure of its significance increase in eosinophils and could be secondary to a reactive underlying process such as her inflammatory arthritis vs allergies vs neoplastic. We have discussed with her the possibility of underlying bone marrow process that would require bone marrow biopsy to further evaluate. However, given stable counts at present she would like to continue to monitor and does not want to pursue bone marrrow.    - On repeat flow cytometry NK cells were no longer noted however she continued to have an elevated eosinophil level - suspected this was likely secondary to her severe underlying allergies versus bladder cancer. She was treated empirically for an underlying parasitic infection with Ivermectin. Her eosinophils later normalized and are again elevated, and we have repeatedly discussed bone marrow biopsy and at present she is  not interested.  - Anemia is stable at present.     Bladder Malignancy  Weight loss now resolved  - Patient reports this was completely excised without muscular involvement and adjuvant therapy with chemotherapy or radiation was not recommended.  - She follows with Dr. Kenney every 3 months in [place], [place] with [facility] with repeat imaging. She reports that her most recent appointment last month went well without any evidence of recurrence. We have been unable to get records since 2018. Will again request records she is adamant that she followed with them in July 2021.   - Her weight loss was initially attributed to decreased oral intake due to ill fitting dentures and later to thyroid issues, and later with a good appetite she continued to lose weight. Therefore, given eosinophilia, weight loss, chronic cough, and history of bladder malignancy I did obtain a CT chest, abdomen, and pelvis which did not show any evidence of metastatic disease. Multiple granulomas seen and she reports that this is chronic and known to her and was diagnosed after infection in childhood.     Iron deficiency  Zinc deficiency  - She was previously on iron supplementation however was unable to tolerate  - Copper normal, however zinc was low. She is currently on iron containing MVI and has discontinued her zinc supplementation. She continues to be on folic acid as she is currently on methotrexate. Iron studies elevated therefore oral iron was discontinued. She does remain on a MVI.     ACO Quality measures  -  Kat Kenney does not use tobacco products.  -  Kat Kenney did not receive 2021 flu vaccine. She will be getting his at her PCPs office.    -  Kat Kenney reports a pain score of 0.  Given her pain assessment as noted, treatment options were discussed and the following options were decided upon as a follow-up plan to address the patient's pain: continuation of current treatment plan for pain.  -  Current  outpatient and discharge medications have been reconciled for the patient.  Reviewed by: Rosmery Powell MD      I will have the patient return for follow up appointment as previously scheduled in three months with NP and in six months with myself. She understands that should she have any questions or concerns prior to her appointment she should give us a call at any time and I would be happy to see her sooner. It was a pleasure to talk to this patient in clinic today, thank you for allowing me to participate in the care of this patient.    A total of 35 minutes were spent coordinating this patient’s care in clinic today; more than 50% of this time was with the patient, reviewing their medical history and counseling on the current treatment and followup plan. All questions were answered to their satisfaction.      Rosmery Powell MD  11/11/21  13:01 EST

## 2021-11-12 LAB
FOLATE SERPL-MCNC: >20 NG/ML (ref 4.78–24.2)
VIT B12 BLD-MCNC: 911 PG/ML (ref 211–946)

## 2021-11-17 LAB — ZINC SERPL-MCNC: 58 UG/DL (ref 44–115)

## 2022-05-11 ENCOUNTER — HOSPITAL ENCOUNTER (OUTPATIENT)
Dept: MAMMOGRAPHY | Facility: HOSPITAL | Age: 87
Discharge: HOME | End: 2022-05-11
Admitting: INTERNAL MEDICINE

## 2022-05-11 ENCOUNTER — OFFICE VISIT (OUTPATIENT)
Dept: ONCOLOGY | Facility: CLINIC | Age: 87
End: 2022-05-11

## 2022-05-11 VITALS
TEMPERATURE: 97.5 F | SYSTOLIC BLOOD PRESSURE: 145 MMHG | DIASTOLIC BLOOD PRESSURE: 71 MMHG | OXYGEN SATURATION: 96 % | WEIGHT: 158.6 LBS | RESPIRATION RATE: 18 BRPM | HEIGHT: 65 IN | BODY MASS INDEX: 26.42 KG/M2 | HEART RATE: 86 BPM

## 2022-05-11 DIAGNOSIS — D64.9 NORMOCYTIC ANEMIA: ICD-10-CM

## 2022-05-11 DIAGNOSIS — Z12.31 VISIT FOR SCREENING MAMMOGRAM: ICD-10-CM

## 2022-05-11 DIAGNOSIS — E03.9 HYPOTHYROIDISM, UNSPECIFIED TYPE: ICD-10-CM

## 2022-05-11 DIAGNOSIS — D72.19 OTHER EOSINOPHILIA: ICD-10-CM

## 2022-05-11 DIAGNOSIS — E60 ZINC DEFICIENCY: ICD-10-CM

## 2022-05-11 DIAGNOSIS — D50.9 IRON DEFICIENCY ANEMIA, UNSPECIFIED IRON DEFICIENCY ANEMIA TYPE: Primary | ICD-10-CM

## 2022-05-11 LAB
BASOPHILS # BLD AUTO: 0.06 10*3/MM3 (ref 0–0.2)
BASOPHILS NFR BLD AUTO: 0.9 % (ref 0–1.5)
DEPRECATED RDW RBC AUTO: 49.1 FL (ref 37–54)
EOSINOPHIL # BLD AUTO: 0.68 10*3/MM3 (ref 0–0.4)
EOSINOPHIL NFR BLD AUTO: 9.8 % (ref 0.3–6.2)
ERYTHROCYTE [DISTWIDTH] IN BLOOD BY AUTOMATED COUNT: 13.8 % (ref 12.3–15.4)
FERRITIN SERPL-MCNC: 81.47 NG/ML (ref 13–150)
HCT VFR BLD AUTO: 28.4 % (ref 34–46.6)
HGB BLD-MCNC: 9.4 G/DL (ref 12–15.9)
IMM GRANULOCYTES # BLD AUTO: 0.05 10*3/MM3 (ref 0–0.05)
IMM GRANULOCYTES NFR BLD AUTO: 0.7 % (ref 0–0.5)
IRON 24H UR-MRATE: 130 MCG/DL (ref 37–145)
IRON SATN MFR SERPL: 36 % (ref 20–50)
LYMPHOCYTES # BLD AUTO: 1.9 10*3/MM3 (ref 0.7–3.1)
LYMPHOCYTES NFR BLD AUTO: 27.3 % (ref 19.6–45.3)
MCH RBC QN AUTO: 32.9 PG (ref 26.6–33)
MCHC RBC AUTO-ENTMCNC: 33.1 G/DL (ref 31.5–35.7)
MCV RBC AUTO: 99.3 FL (ref 79–97)
MONOCYTES # BLD AUTO: 0.58 10*3/MM3 (ref 0.1–0.9)
MONOCYTES NFR BLD AUTO: 8.3 % (ref 5–12)
NEUTROPHILS NFR BLD AUTO: 3.7 10*3/MM3 (ref 1.7–7)
NEUTROPHILS NFR BLD AUTO: 53 % (ref 42.7–76)
NRBC BLD AUTO-RTO: 0 /100 WBC (ref 0–0.2)
PLATELET # BLD AUTO: 187 10*3/MM3 (ref 140–450)
PMV BLD AUTO: 9.3 FL (ref 6–12)
RBC # BLD AUTO: 2.86 10*6/MM3 (ref 3.77–5.28)
TIBC SERPL-MCNC: 362 MCG/DL (ref 298–536)
TRANSFERRIN SERPL-MCNC: 243 MG/DL (ref 200–360)
WBC NRBC COR # BLD: 6.97 10*3/MM3 (ref 3.4–10.8)

## 2022-05-11 PROCEDURE — 82728 ASSAY OF FERRITIN: CPT | Performed by: INTERNAL MEDICINE

## 2022-05-11 PROCEDURE — 84630 ASSAY OF ZINC: CPT | Performed by: INTERNAL MEDICINE

## 2022-05-11 PROCEDURE — 77067 SCR MAMMO BI INCL CAD: CPT | Performed by: RADIOLOGY

## 2022-05-11 PROCEDURE — 77063 BREAST TOMOSYNTHESIS BI: CPT | Performed by: RADIOLOGY

## 2022-05-11 PROCEDURE — 77067 SCR MAMMO BI INCL CAD: CPT

## 2022-05-11 PROCEDURE — 85025 COMPLETE CBC W/AUTO DIFF WBC: CPT | Performed by: INTERNAL MEDICINE

## 2022-05-11 PROCEDURE — 99214 OFFICE O/P EST MOD 30 MIN: CPT | Performed by: INTERNAL MEDICINE

## 2022-05-11 PROCEDURE — 88237 TISSUE CULTURE BONE MARROW: CPT

## 2022-05-11 PROCEDURE — 84466 ASSAY OF TRANSFERRIN: CPT | Performed by: INTERNAL MEDICINE

## 2022-05-11 PROCEDURE — 83540 ASSAY OF IRON: CPT | Performed by: INTERNAL MEDICINE

## 2022-05-11 PROCEDURE — 88271 CYTOGENETICS DNA PROBE: CPT

## 2022-05-11 PROCEDURE — 88275 CYTOGENETICS 100-300: CPT

## 2022-05-11 PROCEDURE — 77063 BREAST TOMOSYNTHESIS BI: CPT

## 2022-05-11 RX ORDER — ISOSORBIDE MONONITRATE 30 MG/1
30 TABLET, EXTENDED RELEASE ORAL DAILY
COMMUNITY

## 2022-05-11 NOTE — PROGRESS NOTES
Kat Kenney  [id number]  1/3/1932  5/11/2022      Referring Provider:   Dr. Evelyn Douglas    Reason for Followup:   Normocytic Anemia    Chief Complaint:  Normocytic Anemia      History of Present Illness:  Kat Kenney is a very pleasant 89 y.o.  female who presents in follow up appointment for further management and evaluation of normocytic anemia.     She was found to have anemia while residing in the nursing home for rehabilitation after knee replacement at the end of 2016. At the time of her initial consultation she had left [facility] and was then living at home and given her persistent anemia she was referred to our clinic for further evaluation. She was previously placed on oral iron for a history of iron deficiency however was unable to tolerate the medication due to intolerance and is now currently taking a multivitamin containing iron. She had previously followed with Dr. Morris for possible underlying malignancy but reports that workup was negative (including a bone marrow biopsy) and she had not followed with him in for two years prior to her initial consultation with me with the exception in that she followed with him October 2016 where she received 2 units of packed red blood cells for her anemia. She has not required further blood transfusions since that time. She did also have a bone marrow in 2006 which did reveal a mildly hypercellular marrow for her age with trilineage hematopoiesis. She also has been following with Dr. Kenney for hematuria and frequent urinary tract infections and underwent a cystoscopy as well as TURBT and pathology was significant for bladder cancer with no detrusor invasion. Chemotherapy or radiation was not recommended and she follows with Dr. Kenney every 3 months and recently underwent imaging that was negative. She states that she was evaluated by Dr. Kenney last summer however we contacted their office to obtain records and she had not been seen since 2018.     She  has been having unintentional weight loss since 2019 and more so in 2020. She states that at first it was felt to be due to decreased oral intake due to ill fitting dentures and later felt to be due to her thyroid medication. However continued to lose weight despite adjustments. She reports that she has had imaging of her chest at [facility] with the last in Feb 2020 for chronic cough without any underlying cause seen. Her primary provider is also making a referral to ENT. She has also followed with Dr. Cunningham in gastroenterology and had an EGD/colonoscopy without active bleeding and significant for a hiatal hernia. She also continues to follow with Dr. Luz at the [place] [facility] for polyarthritis for which she is currently taking methotrexate and folic acid. She otherwise denies of any fevers, night sweats, lymphadenopathy.    Interim History:  She denies of any bleeding and reports that she was evaluated by Dr. Kenney (with [facility]) July 2021 and reports that everything was felt to be fine and could return in one year and has an appointment this July. She denies of any infections or antibiotics since her last visit but notes that her allergies have been severe this last month.             The following portions of the patient's history were reviewed and updated as appropriate: allergies, current medications, past family history, past medical history, past social history, past surgical history and problem list.    Allergies   Allergen Reactions   • Benadryl [Diphenhydramine Hcl (Sleep)]    • Levaquin [Levofloxacin]    • Penicillins    • Zocor [Simvastatin] Unknown (See Comments)     cardioligist took her off of this and said she was allergic       Past Medical History:   Diagnosis Date   • Anemia    • Asthma    • Disease of thyroid gland    • GERD (gastroesophageal reflux disease)    • Hypertension    Coronary artery disease s/p stent placement   Sick sinus  syndrome  Hypothryoidism  Hyperlipidemia  Inflammatory arthritis  Bladder Cancer completely excised         Past Surgical History:   Procedure Laterality Date   • BLADDER TUMOR EXCISION  07/30/2018   • BREAST BIOPSY Left    • HYSTERECTOMY     • REPLACEMENT TOTAL KNEE     • SINUS SURGERY     Pacemaker        Social History     Socioeconomic History   • Marital status:    Tobacco Use   • Smoking status: Never Smoker   • Smokeless tobacco: Never Used   Vaping Use   • Vaping Use: Never used   Substance and Sexual Activity   • Alcohol use: No   • Drug use: No   • Sexual activity: Defer   She lives by herself.    Family History   Problem Relation Age of Onset   • Breast cancer Sister    • Cancer Brother          Current Outpatient Medications:   •  Albuterol Sulfate (PROAIR HFA IN), Inhale 2 puffs 2 (two) times a day., Disp: , Rfl:   •  aspirin 81 MG EC tablet, Take 81 mg by mouth daily., Disp: , Rfl:   •  atenolol (TENORMIN) 25 MG tablet, Take 25 mg by mouth Daily., Disp: , Rfl:   •  atorvastatin (LIPITOR) 20 MG tablet, Take 20 mg by mouth daily., Disp: , Rfl:   •  azelastine (ASTELIN) 0.1 % nasal spray, 2 sprays into the nostril(s) as directed by provider 2 (Two) Times a Day. Use in each nostril as directed, Disp: , Rfl:   •  benzonatate (TESSALON) 200 MG capsule, Take 200 mg by mouth 3 (Three) Times a Day As Needed for Cough., Disp: , Rfl:   •  conjugated estrogens (PREMARIN) 0.625 MG/GM vaginal cream, Insert 0.5 g into the vagina daily., Disp: , Rfl:   •  esomeprazole (nexIUM) 40 MG capsule, Take 40 mg by mouth Every Morning Before Breakfast., Disp: , Rfl:   •  famotidine (PEPCID) 20 MG tablet, Take 20 mg by mouth 2 (Two) Times a Day., Disp: , Rfl:   •  Fluticasone Furoate-Vilanterol (BREO ELLIPTA IN), Inhale., Disp: , Rfl:   •  folic acid (FOLVITE) 1 MG tablet, Take 1 mg by mouth daily., Disp: , Rfl:   •  hydroCHLOROthiazide (MICROZIDE) 12.5 MG capsule, Take 12.5 mg by mouth Daily., Disp: , Rfl:   •   isosorbide mononitrate (IMDUR) 30 MG 24 hr tablet, Take 30 mg by mouth Daily. Taking 1/2 tablet daily., Disp: , Rfl:   •  levocetirizine (XYZAL) 5 MG tablet, Take 5 mg by mouth Every Evening., Disp: , Rfl:   •  levothyroxine (SYNTHROID, LEVOTHROID) 100 MCG tablet, Take 100 mcg by mouth daily., Disp: , Rfl:   •  losartan (COZAAR) 100 MG tablet, Take 100 mg by mouth Daily., Disp: , Rfl:   •  methotrexate 2.5 MG tablet, Take 2.5 mg by mouth 1 (one) time per week., Disp: , Rfl:   •  O2 (OXYGEN), Inhale 2 L/min every night., Disp: , Rfl:   •  omeprazole (PriLOSEC) 40 MG capsule, Take 40 mg by mouth daily., Disp: , Rfl:   •  oxybutynin XL (DITROPAN-XL) 5 MG 24 hr tablet, Take 5 mg by mouth 2 (Two) Times a Day., Disp: , Rfl:   •  Ped Multivitamins-Fl-Iron (MULTIVITAMIN WITH FLUORIDE/IRON) 0.25-10 MG/ML solution solution, Take  by mouth Daily., Disp: , Rfl:   •  Probiotic Product (PROBIOTIC DAILY PO), Take 1 tablet by mouth daily., Disp: , Rfl:   •  sodium chloride (OCEAN) 0.65 % nasal spray, 1 spray into each nostril as needed for congestion., Disp: , Rfl:   •  doxycycline (MONODOX) 100 MG capsule, Take 1 capsule by mouth 2 (Two) Times a Day., Disp: 14 capsule, Rfl: 0  •  rOPINIRole (REQUIP) 0.25 MG tablet, Take 0.25 mg by mouth Every Night. Take 1 hour before bedtime., Disp: , Rfl:         Review of Systems:  A comprehensive 14 point review of systems was conducted with patient and positive as per HPI otherwise negative. Patient denies of any bleeding.        Physical Exam:  Vital Signs: These were reviewed and listed as per patient’s electronic medical chart  Vitals:    05/11/22 1309   BP: 145/71   Pulse: 86   Resp: 18   Temp: 97.5 °F (36.4 °C)   SpO2: 96%     General: Awake, alert and oriented, in no distress  HEENT: Head is atraumatic, normocephalic, extraocular movements full, no scleral icterus, mask in place  Neck: supple, no jvd, lymphadenopathy or masses  Cardiovascular: regular rate and rhythm without murmurs,  rubs or gallops  Pulmonary: non-labored, clear to auscultation bilaterally, no wheezing  Abdomen: soft, non-tender, non-distended, normal active bowel sounds present  Extremities: No clubbing, cyanosis or edema  Lymph: No cervical, supraclavicular adenopathy  Neurologic: Mental status as above, alert, awake and oriented, grossly non-focal exam  Skin: warm, dry, intact, chronic skin lesions  MSK: mobilizing with rolling walker            Labs / Studies:  LABS:            ENDOSCOPIC REPORTS:  2019:              IMAGIN/17/20:  CT CHEST WITH CONTRAST: Contrast was injected intravenously and spiral scans were obtained from the apices of the lung and extended to the diaphragm. Both lung and soft tissue windows were archived. On the lung windows, the lungs are generally hyperinflated. There are granulomas seen scattered throughout both lungs. There are calcified lymph nodes in the mediastinum and hilar areas. There is some minimal atelectasis in the left lung base. No pulmonary parenchymal lung nodules to suggest metastatic disease were demonstrated. On the mediastinal windows, there were no enlarged lymph nodes that were not calcified in the mediastinum or hilar areas. There is a large hiatal hernia of the stomach measuring approximately 9 cm in diameter. IMPRESSION: No CT findings to suggest metastatic disease in the chest. There are multiple granulomas in the lungs and calcifications in the mediastinal lymph nodes. There is a large hiatal hernia of the stomach.     CT ABDOMEN PELVIS W CONTRAST: Images obtained through the lower chest show a large hiatal hernia of the stomach in the lower mediastinum. The hernia has a diameter of approximately 9 cm. There are multiple granulomas noted in the spleen. The spleen was not enlarged. The liver showed no evidence of focal abnormality. There is no thickening of the gallbladder wall or dilatation of the bile ducts in the liver. The pancreas  showed no evidence of mass or inflammation. The kidneys showed no evidence of obstruction. No focal renal parenchymal abnormalities were demonstrated. The aorta is normal in caliber. No enlarged lymph nodes were noted. The bowel shows no evidence of obstruction. There is a moderate amount of gas and fecal material throughout the colon suggesting constipation. There is no ascites in the abdomen. In the pelvis, the urinary bladder is moderately distended with urine. There is no thickening of the bladder wall. No masses or fluid collections were seen in the pelvis. IMPRESSION: No CT findings of metastatic disease. Urinary bladder is moderately distended with urine but showed no focal bladder wall abnormalities. There is a large amount of gas and stool in the colon suggesting constipation. There is a very large hiatal hernia of the stomach in the lower mediastinum.          PATHOLOGY:  09/02/16: Peripheral Smear: Normocytic anemia with mild anisopoikilocytosis. Normal white blood cell count and morphology, adequate platelets.  02/09/17: Peripheral Smear:          12/19/16: Flow cytometry:         03/29/17: Flow Cytometry:        06/21/18: Flow Cytometry:            Assessment & Plan :  Kat Kenney is a very pleasant 89 y.o.  female who presents in follow up appointment for further management and evaluation of normocytic anemia.      Normocytic Anemia  Absolute Mild Eosinophilia  - This is likely multifactorial and secondary to iron deficiency versus anemia of chronic inflammation from her arthritis versus medications (MTX) versus blood loss from previous hematuria (now resolved) which are all likely contributing.   - A complete blood count showed ongoing anemia however this improved with resolution of hematuria. She did receive 2 units of packed red blood cells at Dr. Márquez office on 10/2016. Her serum iron and iron saturation were low with a ferritin of 44 during that visit and also has iron deficiency along  with anemia of chronic inflammation.   - Today her iron panel continues to be within normal limits without supplementation (she is only on a MVI containing iron, she previously took oral iron and unable to tolerate). B12 and folate have also been normal.   - She had a negative GREY and did not show signs of hemolysis. ESR was elevated which is likely secondary to her underlying inflammatory arthritis which can also cause anemia of chronic inflammation and hence bone marrow suppression. Acute hepatitis panel non reactive.  - SPEP showed no evidence of Mspike and there was no abnormal immunofixation. Her serum free light chains were both elevated with a normal ratio and can be elevated in the setting of underlying inflammation.   - Possible that her inflammatory arthritis is causing inflammation which is suppressing her bone marrow and thus contributing to her anemia. Also obtained a rheumatoid factor and SUDEEP which were both negative. She continues to follow with Rheumatology.  - Obtained a flow cytometry which was significant for increased atypical NK cells and was unsure of its significance increase in eosinophils and could be secondary to a reactive underlying process such as her inflammatory arthritis vs allergies vs neoplastic. We have discussed with her the possibility of underlying bone marrow process that would require bone marrow biopsy to further evaluate. However, given stable counts at present she would like to continue to monitor and does not want to pursue bone marrrow.    - On repeat flow cytometry NK cells were no longer noted however she continued to have an elevated eosinophil level - suspected this was likely secondary to her severe underlying allergies versus bladder cancer. She was treated empirically for an underlying parasitic infection with Ivermectin. Her eosinophils later normalized and are again elevated, and we have discussed bone marrow biopsy and she repeatedly has said that she is not  interested as she would not any treatment if she had an underlying bone marrow disorder or cancer.   - Anemia is stable at present. Will repeat flow cytometry today along with FISH eosinophilia panel.     Bladder Malignancy  Weight loss now resolved  - Patient reports this was completely excised without muscular involvement and adjuvant therapy with chemotherapy or radiation was not recommended.  - She follows with Dr. Kenney every six to twelve months in [place], [place] with [facility] with repeat imaging. She reports that her most recent appointment last month went well without any evidence of recurrence. We have been unable to get records since 2018. Have repeatedly requested records as she is adamant that she followed with them in July 2021.   - Her weight loss was initially attributed to decreased oral intake due to ill fitting dentures and later to thyroid issues, and later with a good appetite she continued to lose weight. Therefore, given eosinophilia, weight loss, chronic cough, and history of bladder malignancy I did obtain a CT chest, abdomen, and pelvis which did not show any evidence of metastatic disease. Multiple granulomas seen and she reports that this is chronic and known to her and was diagnosed after infection in childhood.   - She has now been gaining weight.     Iron deficiency  Zinc deficiency  - She was previously on iron supplementation however was unable to tolerate  - Copper normal, however zinc was low. She is currently on iron containing MVI and has discontinued her zinc supplementation. She continues to be on folic acid as she is currently on methotrexate. Iron studies elevated therefore oral iron was discontinued. She does remain on a MVI.     ACO Quality measures  -  Kat Kenney does not use tobacco products.  -  Kat Kenney received 2021 flu vaccine.  -  Kat Kenney reports a pain score of 5.  Given her pain assessment as noted, treatment options were discussed and  the following options were decided upon as a follow-up plan to address the patient's pain: referral to Primary Care for assistance in pain treatment guidance.  -  Current outpatient and discharge medications have been reconciled for the patient.  Reviewed by: Rosmery Powell MD      I will have the patient return for labs in three months (CBC, iron panel, ferritin) and in follow up appointment in six months. She understands that should she have any questions or concerns prior to her appointment she should give us a call at any time and I would be happy to see her sooner. It was a pleasure to talk to this patient in clinic today, thank you for allowing me to participate in the care of this patient.    A total of 33 minutes were spent coordinating this patient’s care in clinic today; more than 50% of this time was with the patient, reviewing their medical history and counseling on the current treatment and followup plan. All questions were answered to their satisfaction.      Rosmery Powell MD  05/11/22  14:21 EDT

## 2022-05-14 LAB — ZINC SERPL-MCNC: 52 UG/DL (ref 44–115)

## 2022-05-16 LAB
CELLS ANALYZED: NORMAL
CELLS COUNTED: NORMAL
CHROM ANALY RESULT (ISCN): NORMAL
CLINICAL CYTOGENETICIST SPEC: NORMAL
DIAGNOSTIC IMP SPEC-IMP: NORMAL
LEUK/LYMPH PHENO: NORMAL
SPECIMEN SOURCE: NORMAL

## 2022-06-01 ENCOUNTER — APPOINTMENT (OUTPATIENT)
Dept: MAMMOGRAPHY | Facility: HOSPITAL | Age: 87
End: 2022-06-01

## 2022-06-14 ENCOUNTER — HOSPITAL ENCOUNTER (OUTPATIENT)
Dept: MAMMOGRAPHY | Facility: HOSPITAL | Age: 87
Discharge: HOME | End: 2022-06-14
Admitting: RADIOLOGY

## 2022-06-14 DIAGNOSIS — R92.8 ABNORMAL MAMMOGRAM OF LEFT BREAST: ICD-10-CM

## 2022-06-14 PROCEDURE — G0279 TOMOSYNTHESIS, MAMMO: HCPCS

## 2022-06-14 PROCEDURE — G0279 TOMOSYNTHESIS, MAMMO: HCPCS | Performed by: RADIOLOGY

## 2022-06-14 PROCEDURE — 77065 DX MAMMO INCL CAD UNI: CPT

## 2022-06-14 PROCEDURE — 77065 DX MAMMO INCL CAD UNI: CPT | Performed by: RADIOLOGY

## 2022-08-18 ENCOUNTER — LAB (OUTPATIENT)
Dept: ONCOLOGY | Facility: CLINIC | Age: 87
End: 2022-08-18

## 2022-08-18 VITALS
SYSTOLIC BLOOD PRESSURE: 118 MMHG | HEART RATE: 84 BPM | DIASTOLIC BLOOD PRESSURE: 58 MMHG | TEMPERATURE: 96.9 F | RESPIRATION RATE: 18 BRPM | OXYGEN SATURATION: 98 %

## 2022-08-18 DIAGNOSIS — E60 ZINC DEFICIENCY: ICD-10-CM

## 2022-08-18 DIAGNOSIS — D50.9 IRON DEFICIENCY ANEMIA, UNSPECIFIED IRON DEFICIENCY ANEMIA TYPE: ICD-10-CM

## 2022-08-18 DIAGNOSIS — D64.9 NORMOCYTIC ANEMIA: ICD-10-CM

## 2022-08-18 DIAGNOSIS — D72.19 OTHER EOSINOPHILIA: ICD-10-CM

## 2022-08-18 LAB
BASOPHILS # BLD AUTO: 0.04 10*3/MM3 (ref 0–0.2)
BASOPHILS NFR BLD AUTO: 0.8 % (ref 0–1.5)
DEPRECATED RDW RBC AUTO: 48.3 FL (ref 37–54)
EOSINOPHIL # BLD AUTO: 0.66 10*3/MM3 (ref 0–0.4)
EOSINOPHIL NFR BLD AUTO: 13.9 % (ref 0.3–6.2)
ERYTHROCYTE [DISTWIDTH] IN BLOOD BY AUTOMATED COUNT: 13.8 % (ref 12.3–15.4)
FERRITIN SERPL-MCNC: 96.54 NG/ML (ref 13–150)
HCT VFR BLD AUTO: 29.6 % (ref 34–46.6)
HGB BLD-MCNC: 10 G/DL (ref 12–15.9)
IMM GRANULOCYTES # BLD AUTO: 0.01 10*3/MM3 (ref 0–0.05)
IMM GRANULOCYTES NFR BLD AUTO: 0.2 % (ref 0–0.5)
IRON 24H UR-MRATE: 70 MCG/DL (ref 37–145)
IRON SATN MFR SERPL: 19 % (ref 20–50)
LYMPHOCYTES # BLD AUTO: 1.56 10*3/MM3 (ref 0.7–3.1)
LYMPHOCYTES NFR BLD AUTO: 32.9 % (ref 19.6–45.3)
MCH RBC QN AUTO: 32.8 PG (ref 26.6–33)
MCHC RBC AUTO-ENTMCNC: 33.8 G/DL (ref 31.5–35.7)
MCV RBC AUTO: 97 FL (ref 79–97)
MONOCYTES # BLD AUTO: 0.32 10*3/MM3 (ref 0.1–0.9)
MONOCYTES NFR BLD AUTO: 6.8 % (ref 5–12)
NEUTROPHILS NFR BLD AUTO: 2.15 10*3/MM3 (ref 1.7–7)
NEUTROPHILS NFR BLD AUTO: 45.4 % (ref 42.7–76)
NRBC BLD AUTO-RTO: 0 /100 WBC (ref 0–0.2)
PLATELET # BLD AUTO: 171 10*3/MM3 (ref 140–450)
PMV BLD AUTO: 9.3 FL (ref 6–12)
RBC # BLD AUTO: 3.05 10*6/MM3 (ref 3.77–5.28)
TIBC SERPL-MCNC: 375 MCG/DL (ref 298–536)
TRANSFERRIN SERPL-MCNC: 252 MG/DL (ref 200–360)
WBC NRBC COR # BLD: 4.74 10*3/MM3 (ref 3.4–10.8)

## 2022-08-18 PROCEDURE — 83540 ASSAY OF IRON: CPT | Performed by: INTERNAL MEDICINE

## 2022-08-18 PROCEDURE — 82728 ASSAY OF FERRITIN: CPT | Performed by: INTERNAL MEDICINE

## 2022-08-18 PROCEDURE — 85025 COMPLETE CBC W/AUTO DIFF WBC: CPT | Performed by: INTERNAL MEDICINE

## 2022-08-18 PROCEDURE — 84466 ASSAY OF TRANSFERRIN: CPT | Performed by: INTERNAL MEDICINE

## 2022-11-03 NOTE — PROGRESS NOTES
Kat Kenney  [id number]  1/3/1932  11/4/2022      Referring Provider:   Dr. Evleyn Douglas    Reason for Followup:   Normocytic Anemia    Chief Complaint:  Normocytic Anemia      History of Present Illness:  Kat Kenney is a very pleasant 89 y.o.  female who presents in follow up appointment for further management and evaluation of normocytic anemia.     She was found to have anemia while residing in the nursing home for rehabilitation after knee replacement at the end of 2016. At the time of her initial consultation she had left [facility] and was then living at home and given her persistent anemia she was referred to our clinic for further evaluation. She was previously placed on oral iron for a history of iron deficiency however was unable to tolerate the medication due to intolerance and is now currently taking a multivitamin containing iron. She had previously followed with Dr. Morris for possible underlying malignancy but reports that workup was negative (including a bone marrow biopsy) and she had not followed with him in for two years prior to her initial consultation with me with the exception in that she followed with him October 2016 where she received 2 units of packed red blood cells for her anemia. She has not required further blood transfusions since that time. She did also have a bone marrow in 2006 which did reveal a mildly hypercellular marrow for her age with trilineage hematopoiesis. She also has been following with Dr. Kenney for hematuria and frequent urinary tract infections and underwent a cystoscopy as well as TURBT and pathology was significant for bladder cancer with no detrusor invasion. Chemotherapy or radiation was not recommended and she follows with Dr. Kenney every 3 months and recently underwent imaging that was negative. She states that she was evaluated by Dr. Kenney last summer however we contacted their office to obtain records and she had not been seen since 2018.     She  has been having unintentional weight loss since 2019 and more so in 2020. She states that at first it was felt to be due to decreased oral intake due to ill fitting dentures and later felt to be due to her thyroid medication. However continued to lose weight despite adjustments. She reports that she has had imaging of her chest at [facility] with the last in Feb 2020 for chronic cough without any underlying cause seen. Her primary provider is also making a referral to ENT. She has also followed with Dr. Cunningham in gastroenterology and had an EGD/colonoscopy without active bleeding and significant for a hiatal hernia. She also continues to follow with Dr. Luz at the [facility] for polyarthritis for which she is currently taking methotrexate and folic acid. She otherwise denies of any fevers, night sweats, lymphadenopathy.    Interim History:  Ms. Kenney presents today for follow up. She denies of any bleeding and reports that she was evaluated by Dr. Kenney (with [facility]) July 2022 and reports that everything is fine and returns once yearly. She denies of any bleeding. No fevers, night sweats, weight loss, or lymphadenopathy.          The following portions of the patient's history were reviewed and updated as appropriate: allergies, current medications, past family history, past medical history, past social history, past surgical history and problem list.    Allergies   Allergen Reactions   • Benadryl [Diphenhydramine Hcl (Sleep)]    • Levaquin [Levofloxacin]    • Penicillins    • Zocor [Simvastatin] Unknown (See Comments)     cardioligist took her off of this and said she was allergic       Past Medical History:   Diagnosis Date   • Anemia    • Asthma    • Disease of thyroid gland    • GERD (gastroesophageal reflux disease)    • Hypertension    Coronary artery disease s/p stent placement   Sick sinus syndrome  Hypothryoidism  Hyperlipidemia  Inflammatory arthritis  Bladder Cancer  completely excised         Past Surgical History:   Procedure Laterality Date   • BLADDER TUMOR EXCISION  07/30/2018   • BREAST BIOPSY Left    • HYSTERECTOMY     • REPLACEMENT TOTAL KNEE     • SINUS SURGERY     Pacemaker        Social History     Socioeconomic History   • Marital status:    Tobacco Use   • Smoking status: Never   • Smokeless tobacco: Never   Vaping Use   • Vaping Use: Never used   Substance and Sexual Activity   • Alcohol use: No   • Drug use: No   • Sexual activity: Defer   She lives by herself.    Family History   Problem Relation Age of Onset   • Breast cancer Sister    • Cancer Brother          Current Outpatient Medications:   •  Albuterol Sulfate (PROAIR HFA IN), Inhale 2 puffs 2 (two) times a day., Disp: , Rfl:   •  aspirin 81 MG EC tablet, Take 81 mg by mouth daily., Disp: , Rfl:   •  atenolol (TENORMIN) 25 MG tablet, Take 25 mg by mouth Daily., Disp: , Rfl:   •  atorvastatin (LIPITOR) 20 MG tablet, Take 20 mg by mouth daily., Disp: , Rfl:   •  azelastine (ASTELIN) 0.1 % nasal spray, 2 sprays into the nostril(s) as directed by provider 2 (Two) Times a Day. Use in each nostril as directed, Disp: , Rfl:   •  benzonatate (TESSALON) 200 MG capsule, Take 200 mg by mouth 3 (Three) Times a Day As Needed for Cough., Disp: , Rfl:   •  conjugated estrogens (PREMARIN) 0.625 MG/GM vaginal cream, Insert 0.5 g into the vagina daily., Disp: , Rfl:   •  doxycycline (MONODOX) 100 MG capsule, Take 1 capsule by mouth 2 (Two) Times a Day., Disp: 14 capsule, Rfl: 0  •  esomeprazole (nexIUM) 40 MG capsule, Take 40 mg by mouth Every Morning Before Breakfast., Disp: , Rfl:   •  famotidine (PEPCID) 20 MG tablet, Take 20 mg by mouth 2 (Two) Times a Day., Disp: , Rfl:   •  Fluticasone Furoate-Vilanterol (BREO ELLIPTA IN), Inhale., Disp: , Rfl:   •  folic acid (FOLVITE) 1 MG tablet, Take 1 mg by mouth daily., Disp: , Rfl:   •  hydroCHLOROthiazide (MICROZIDE) 12.5 MG capsule, Take 12.5 mg by mouth Daily., Disp: ,  Rfl:   •  isosorbide mononitrate (IMDUR) 30 MG 24 hr tablet, Take 30 mg by mouth Daily. Taking 1/2 tablet daily., Disp: , Rfl:   •  levocetirizine (XYZAL) 5 MG tablet, Take 5 mg by mouth Every Evening., Disp: , Rfl:   •  levothyroxine (SYNTHROID, LEVOTHROID) 100 MCG tablet, Take 100 mcg by mouth daily., Disp: , Rfl:   •  losartan (COZAAR) 100 MG tablet, Take 100 mg by mouth Daily., Disp: , Rfl:   •  methotrexate 2.5 MG tablet, Take 2.5 mg by mouth 1 (one) time per week., Disp: , Rfl:   •  O2 (OXYGEN), Inhale 2 L/min every night., Disp: , Rfl:   •  omeprazole (PriLOSEC) 40 MG capsule, Take 40 mg by mouth daily., Disp: , Rfl:   •  oxybutynin XL (DITROPAN-XL) 5 MG 24 hr tablet, Take 5 mg by mouth 2 (Two) Times a Day., Disp: , Rfl:   •  Ped Multivitamins-Fl-Iron (MULTIVITAMIN WITH FLUORIDE/IRON) 0.25-10 MG/ML solution solution, Take  by mouth Daily., Disp: , Rfl:   •  Probiotic Product (PROBIOTIC DAILY PO), Take 1 tablet by mouth daily., Disp: , Rfl:   •  rOPINIRole (REQUIP) 0.25 MG tablet, Take 0.25 mg by mouth Every Night. Take 1 hour before bedtime., Disp: , Rfl:   •  sodium chloride (OCEAN) 0.65 % nasal spray, 1 spray into each nostril as needed for congestion., Disp: , Rfl:         Review of Systems:  A comprehensive 14 point review of systems was conducted with patient and positive as per HPI otherwise negative. Patient denies of any abnormal bleeding.        Physical Exam:  Vital Signs: These were reviewed and listed as per patient’s electronic medical chart  Vitals:    11/04/22 1048   BP: 132/65   Pulse: 92   Resp: 18   Temp: 97.3 °F (36.3 °C)   SpO2: 96%     General: Awake, alert and oriented, in no distress  HEENT: Head is atraumatic, normocephalic, extraocular movements full, no scleral icterus, mask in place  Neck: supple, no jvd, lymphadenopathy or masses  Cardiovascular: regular rate and rhythm without murmurs, rubs or gallops  Pulmonary: non-labored, clear to auscultation bilaterally, no  wheezing  Abdomen: soft, non-tender, non-distended, normal active bowel sounds present  Extremities: No clubbing, cyanosis or edema  Lymph: No cervical, supraclavicular adenopathy  Neurologic: Mental status as above, alert, awake and oriented, grossly non-focal exam  Skin: warm, dry, intact, chronic skin lesions  MSK: mobilizing with rolling walker  Patient examined on 22 and changes are reflected and noted above.          Labs / Studies:  LABS:            ENDOSCOPIC REPORTS:  2019:              IMAGIN/17/20:  CT CHEST WITH CONTRAST: Contrast was injected intravenously and spiral scans were obtained from the apices of the lung and extended to the diaphragm. Both lung and soft tissue windows were archived. On the lung windows, the lungs are generally hyperinflated. There are granulomas seen scattered throughout both lungs. There are calcified lymph nodes in the mediastinum and hilar areas. There is some minimal atelectasis in the left lung base. No pulmonary parenchymal lung nodules to suggest metastatic disease were demonstrated. On the mediastinal windows, there were no enlarged lymph nodes that were not calcified in the mediastinum or hilar areas. There is a large hiatal hernia of the stomach measuring approximately 9 cm in diameter. IMPRESSION: No CT findings to suggest metastatic disease in the chest. There are multiple granulomas in the lungs and calcifications in the mediastinal lymph nodes. There is a large hiatal hernia of the stomach.     CT ABDOMEN PELVIS W CONTRAST: Images obtained through the lower chest show a large hiatal hernia of the stomach in the lower mediastinum. The hernia has a diameter of approximately 9 cm. There are multiple granulomas noted in the spleen. The spleen was not enlarged. The liver showed no evidence of focal abnormality. There is no thickening of the gallbladder wall or dilatation of the bile ducts in the liver. The pancreas showed  no evidence of mass or inflammation. The kidneys showed no evidence of obstruction. No focal renal parenchymal abnormalities were demonstrated. The aorta is normal in caliber. No enlarged lymph nodes were noted. The bowel shows no evidence of obstruction. There is a moderate amount of gas and fecal material throughout the colon suggesting constipation. There is no ascites in the abdomen. In the pelvis, the urinary bladder is moderately distended with urine. There is no thickening of the bladder wall. No masses or fluid collections were seen in the pelvis. IMPRESSION: No CT findings of metastatic disease. Urinary bladder is moderately distended with urine but showed no focal bladder wall abnormalities. There is a large amount of gas and stool in the colon suggesting constipation. There is a very large hiatal hernia of the stomach in the lower mediastinum.          PATHOLOGY:  09/02/16: Peripheral Smear: Normocytic anemia with mild anisopoikilocytosis. Normal white blood cell count and morphology, adequate platelets.  02/09/17: Peripheral Smear:          12/19/16: Flow cytometry:         03/29/17: Flow Cytometry:        06/21/18: Flow Cytometry:                        Assessment & Plan :  Kat Kenney is a very pleasant 89 y.o.  female who presents in follow up appointment for further management and evaluation of normocytic anemia.      Normocytic Anemia  Absolute Mild Eosinophilia  - This is likely multifactorial and secondary to iron deficiency versus anemia of chronic inflammation from her arthritis versus medications (MTX) versus blood loss from previous hematuria (now resolved) which are all likely contributing.   - A complete blood count showed ongoing anemia however this improved with resolution of hematuria. She did receive 2 units of packed red blood cells at Dr. Márquez office on 10/2016. Her serum iron and iron saturation were low with a ferritin of 44 during that visit and also has iron deficiency  along with anemia of chronic inflammation.   - Today her iron panel continues to be within normal limits without supplementation (she is only on a MVI containing iron, she previously took oral iron and unable to tolerate). B12 and folate have also been normal.   - She had a negative GREY and did not show signs of hemolysis. ESR was elevated which is likely secondary to her underlying inflammatory arthritis which can also cause anemia of chronic inflammation and hence bone marrow suppression. Acute hepatitis panel non reactive.  - SPEP showed no evidence of Mspike and there was no abnormal immunofixation. Her serum free light chains were both elevated with a normal ratio and can be elevated in the setting of underlying inflammation.   - Possible that her inflammatory arthritis is causing inflammation which is suppressing her bone marrow and thus contributing to her anemia. Also obtained a rheumatoid factor and SUDEEP which were both negative. She continues to follow with Rheumatology.  - Obtained a flow cytometry which was significant for increased atypical NK cells and was unsure of its significance increase in eosinophils and could be secondary to a reactive underlying process such as her inflammatory arthritis vs allergies vs neoplastic. We have discussed with her the possibility of underlying bone marrow process that would require bone marrow biopsy to further evaluate. However, given stable counts at present she would like to continue to monitor and does not want to pursue bone marrrow.    - On repeat flow cytometry NK cells were no longer noted however she continued to have an elevated eosinophil level - suspected this was likely secondary to her severe underlying allergies versus bladder cancer. She was treated empirically for an underlying parasitic infection with Ivermectin. Her eosinophils later normalized and are again elevated, and we have discussed bone marrow biopsy and she repeatedly has said that she is  not interested as she would not any treatment if she had an underlying bone marrow disorder or cancer. Repeat flow cytometry significant for mildly increased eosinophils as well as NK cells (7%). MPN panel is normal. She again is not interested in bone marrow and counts along with normocytic anemia has been stable.    Bladder Malignancy  Weight loss now resolved  - Patient reports this was completely excised without muscular involvement and adjuvant therapy with chemotherapy or radiation was not recommended.  - She follows with Dr. Kenney every six to twelve months in [place], [place] with [facility] with repeat imaging. She reports that her most recent appointment last month went well without any evidence of recurrence. We have been unable to get records since 2018. Have repeatedly requested records as she is adamant that she followed with them in July 2022.   - Her weight loss was initially attributed to decreased oral intake due to ill fitting dentures and later to thyroid issues, and later with a good appetite she continued to lose weight. Therefore, given eosinophilia, weight loss, chronic cough, and history of bladder malignancy I did obtain a CT chest, abdomen, and pelvis which did not show any evidence of metastatic disease. Multiple granulomas seen and she reports that this is chronic and known to her and was diagnosed after infection in childhood.   - She has now been gaining weight.     Iron deficiency  Zinc deficiency  - She was previously on iron supplementation however was unable to tolerate  - Copper normal, however zinc was low. She is currently on iron containing MVI and has discontinued her zinc supplementation. She continues to be on folic acid as she is currently on methotrexate. Iron studies elevated therefore oral iron was discontinued. She does remain on a MVI.     ACO Quality measures  -  Kat Kenney does not use tobacco products.  -  Kat Kenney received 2022 flu vaccine.  -   Kat Kenney reports a pain score of 0. Given her pain assessment as noted, treatment options were discussed and the following options were decided upon as a follow-up plan to address the patient's pain: continuation of current treatment plan for pain.  -  Current outpatient and discharge medications have been reconciled for the patient.  Reviewed by: Rosmery Powell MD      I will have the patient return for labs (CBC, iron panel, ferritin, strongyloides antibody, folate, B12, IgE, tryptase) and follow up appointment in six months. She understands that should she have any questions or concerns prior to her appointment she should give us a call at any time and I would be happy to see her sooner. It was a pleasure to talk to this patient in clinic today, thank you for allowing me to participate in the care of this patient.        Rosmery Powell MD  11/04/22  15:14 EDT

## 2022-11-04 ENCOUNTER — OFFICE VISIT (OUTPATIENT)
Dept: ONCOLOGY | Facility: CLINIC | Age: 87
End: 2022-11-04

## 2022-11-04 VITALS
RESPIRATION RATE: 18 BRPM | HEART RATE: 92 BPM | HEIGHT: 65 IN | TEMPERATURE: 97.3 F | BODY MASS INDEX: 24.16 KG/M2 | DIASTOLIC BLOOD PRESSURE: 65 MMHG | WEIGHT: 145 LBS | OXYGEN SATURATION: 96 % | SYSTOLIC BLOOD PRESSURE: 132 MMHG

## 2022-11-04 DIAGNOSIS — D72.19 OTHER EOSINOPHILIA: ICD-10-CM

## 2022-11-04 DIAGNOSIS — E60 ZINC DEFICIENCY: ICD-10-CM

## 2022-11-04 DIAGNOSIS — D50.9 IRON DEFICIENCY ANEMIA, UNSPECIFIED IRON DEFICIENCY ANEMIA TYPE: Primary | ICD-10-CM

## 2022-11-04 DIAGNOSIS — D64.9 NORMOCYTIC ANEMIA: ICD-10-CM

## 2022-11-04 LAB
ALBUMIN SERPL-MCNC: 3.53 G/DL (ref 3.5–5.2)
ALBUMIN/GLOB SERPL: 1.2 G/DL
ALP SERPL-CCNC: 66 U/L (ref 39–117)
ALT SERPL W P-5'-P-CCNC: 13 U/L (ref 1–33)
ANION GAP SERPL CALCULATED.3IONS-SCNC: 8.6 MMOL/L (ref 5–15)
AST SERPL-CCNC: 21 U/L (ref 1–32)
BASOPHILS # BLD AUTO: 0.05 10*3/MM3 (ref 0–0.2)
BASOPHILS NFR BLD AUTO: 1 % (ref 0–1.5)
BILIRUB SERPL-MCNC: 0.4 MG/DL (ref 0–1.2)
BUN SERPL-MCNC: 20 MG/DL (ref 8–23)
BUN/CREAT SERPL: 22.2 (ref 7–25)
CALCIUM SPEC-SCNC: 8.7 MG/DL (ref 8.2–9.6)
CHLORIDE SERPL-SCNC: 102 MMOL/L (ref 98–107)
CO2 SERPL-SCNC: 25.4 MMOL/L (ref 22–29)
CREAT SERPL-MCNC: 0.9 MG/DL (ref 0.57–1)
DEPRECATED RDW RBC AUTO: 55.3 FL (ref 37–54)
EGFRCR SERPLBLD CKD-EPI 2021: 60.9 ML/MIN/1.73
EOSINOPHIL # BLD AUTO: 0.7 10*3/MM3 (ref 0–0.4)
EOSINOPHIL NFR BLD AUTO: 13.8 % (ref 0.3–6.2)
ERYTHROCYTE [DISTWIDTH] IN BLOOD BY AUTOMATED COUNT: 14.6 % (ref 12.3–15.4)
FERRITIN SERPL-MCNC: 86.4 NG/ML (ref 13–150)
GLOBULIN UR ELPH-MCNC: 3 GM/DL
GLUCOSE SERPL-MCNC: 98 MG/DL (ref 65–99)
HCT VFR BLD AUTO: 31.2 % (ref 34–46.6)
HGB BLD-MCNC: 9.8 G/DL (ref 12–15.9)
IMM GRANULOCYTES # BLD AUTO: 0.01 10*3/MM3 (ref 0–0.05)
IMM GRANULOCYTES NFR BLD AUTO: 0.2 % (ref 0–0.5)
IRON 24H UR-MRATE: 75 MCG/DL (ref 37–145)
IRON SATN MFR SERPL: 22 % (ref 20–50)
LYMPHOCYTES # BLD AUTO: 1.76 10*3/MM3 (ref 0.7–3.1)
LYMPHOCYTES NFR BLD AUTO: 34.6 % (ref 19.6–45.3)
MCH RBC QN AUTO: 33.1 PG (ref 26.6–33)
MCHC RBC AUTO-ENTMCNC: 31.4 G/DL (ref 31.5–35.7)
MCV RBC AUTO: 105.4 FL (ref 79–97)
MONOCYTES # BLD AUTO: 0.47 10*3/MM3 (ref 0.1–0.9)
MONOCYTES NFR BLD AUTO: 9.2 % (ref 5–12)
NEUTROPHILS NFR BLD AUTO: 2.1 10*3/MM3 (ref 1.7–7)
NEUTROPHILS NFR BLD AUTO: 41.2 % (ref 42.7–76)
NRBC BLD AUTO-RTO: 0 /100 WBC (ref 0–0.2)
PLATELET # BLD AUTO: 149 10*3/MM3 (ref 140–450)
PMV BLD AUTO: 9.5 FL (ref 6–12)
POTASSIUM SERPL-SCNC: 4.5 MMOL/L (ref 3.5–5.2)
PROT SERPL-MCNC: 6.5 G/DL (ref 6–8.5)
RBC # BLD AUTO: 2.96 10*6/MM3 (ref 3.77–5.28)
SODIUM SERPL-SCNC: 136 MMOL/L (ref 136–145)
TIBC SERPL-MCNC: 334 MCG/DL (ref 298–536)
TRANSFERRIN SERPL-MCNC: 224 MG/DL (ref 200–360)
WBC NRBC COR # BLD: 5.09 10*3/MM3 (ref 3.4–10.8)

## 2022-11-04 PROCEDURE — 99214 OFFICE O/P EST MOD 30 MIN: CPT | Performed by: INTERNAL MEDICINE

## 2022-11-04 PROCEDURE — 84630 ASSAY OF ZINC: CPT | Performed by: INTERNAL MEDICINE

## 2022-11-04 PROCEDURE — 83540 ASSAY OF IRON: CPT | Performed by: INTERNAL MEDICINE

## 2022-11-04 PROCEDURE — 82728 ASSAY OF FERRITIN: CPT | Performed by: INTERNAL MEDICINE

## 2022-11-04 PROCEDURE — 85025 COMPLETE CBC W/AUTO DIFF WBC: CPT | Performed by: INTERNAL MEDICINE

## 2022-11-04 PROCEDURE — 80053 COMPREHEN METABOLIC PANEL: CPT | Performed by: INTERNAL MEDICINE

## 2022-11-04 PROCEDURE — 84466 ASSAY OF TRANSFERRIN: CPT | Performed by: INTERNAL MEDICINE

## 2022-11-07 LAB — ZINC SERPL-MCNC: 58 UG/DL (ref 44–115)

## 2023-04-28 PROBLEM — S82.092S: Status: ACTIVE | Noted: 2023-04-28

## 2023-05-08 PROBLEM — I25.10 CORONARY ARTERY DISEASE: Status: ACTIVE | Noted: 2023-05-08

## 2023-05-08 PROBLEM — I87.2 VENOUS INSUFFICIENCY: Status: ACTIVE | Noted: 2023-05-08
